# Patient Record
Sex: FEMALE | Race: WHITE | NOT HISPANIC OR LATINO | Employment: OTHER | URBAN - METROPOLITAN AREA
[De-identification: names, ages, dates, MRNs, and addresses within clinical notes are randomized per-mention and may not be internally consistent; named-entity substitution may affect disease eponyms.]

---

## 2017-12-12 ENCOUNTER — APPOINTMENT (OUTPATIENT)
Dept: WOMENS IMAGING | Facility: HOSPITAL | Age: 57
End: 2017-12-12

## 2017-12-12 PROCEDURE — G0202 SCR MAMMO BI INCL CAD: HCPCS | Performed by: RADIOLOGY

## 2017-12-12 PROCEDURE — MDREVIEWSP: Performed by: RADIOLOGY

## 2017-12-12 PROCEDURE — 77063 BREAST TOMOSYNTHESIS BI: CPT | Performed by: RADIOLOGY

## 2017-12-12 PROCEDURE — 77067 SCR MAMMO BI INCL CAD: CPT | Performed by: RADIOLOGY

## 2018-12-12 ENCOUNTER — APPOINTMENT (OUTPATIENT)
Dept: WOMENS IMAGING | Facility: HOSPITAL | Age: 58
End: 2018-12-12

## 2018-12-12 PROCEDURE — 77063 BREAST TOMOSYNTHESIS BI: CPT | Performed by: RADIOLOGY

## 2018-12-12 PROCEDURE — MDREVIEWSP: Performed by: RADIOLOGY

## 2018-12-12 PROCEDURE — 77080 DXA BONE DENSITY AXIAL: CPT | Performed by: RADIOLOGY

## 2018-12-12 PROCEDURE — 77067 SCR MAMMO BI INCL CAD: CPT | Performed by: RADIOLOGY

## 2019-04-04 ENCOUNTER — HOSPITAL ENCOUNTER (OUTPATIENT)
Dept: OTHER | Facility: HOSPITAL | Age: 59
Discharge: HOME | End: 2019-04-04

## 2019-04-04 LAB
25(OH)D3 SERPL-MCNC: 44.2 NG/ML (ref 30–100)
ALBUMIN SERPL-MCNC: 4.1 G/DL (ref 3.5–5)
ALBUMIN/GLOB SERPL: 1.2 {RATIO} (ref 1.4–2.6)
ALP SERPL-CCNC: 79 U/L (ref 53–141)
ALT SERPL-CCNC: 18 U/L (ref 10–40)
ANION GAP SERPL CALC-SCNC: 18 MMOL/L (ref 8–19)
AST SERPL-CCNC: 25 U/L (ref 15–50)
BASOPHILS # BLD AUTO: 0.04 10*3/UL (ref 0–0.2)
BASOPHILS NFR BLD AUTO: 0.8 % (ref 0–3)
BILIRUB SERPL-MCNC: 1.19 MG/DL (ref 0.2–1.3)
BUN SERPL-MCNC: 11 MG/DL (ref 5–25)
BUN/CREAT SERPL: 11 {RATIO} (ref 6–20)
CALCIUM SERPL-MCNC: 9.7 MG/DL (ref 8.7–10.4)
CHLORIDE SERPL-SCNC: 103 MMOL/L (ref 99–111)
CHOLEST SERPL-MCNC: 251 MG/DL (ref 107–200)
CHOLEST/HDLC SERPL: 4.6 {RATIO} (ref 3–6)
CONV ABS IMM GRAN: 0 10*3/UL (ref 0–0.2)
CONV CO2: 24 MMOL/L (ref 22–32)
CONV IMMATURE GRAN: 0 % (ref 0–1.8)
CONV TOTAL PROTEIN: 7.5 G/DL (ref 6.3–8.2)
CREAT UR-MCNC: 0.98 MG/DL (ref 0.5–0.9)
DEPRECATED RDW RBC AUTO: 41.5 FL (ref 36.4–46.3)
EOSINOPHIL # BLD AUTO: 0.13 10*3/UL (ref 0–0.7)
EOSINOPHIL # BLD AUTO: 2.5 % (ref 0–7)
ERYTHROCYTE [DISTWIDTH] IN BLOOD BY AUTOMATED COUNT: 12.9 % (ref 11.7–14.4)
EST. AVERAGE GLUCOSE BLD GHB EST-MCNC: 111 MG/DL
GFR SERPLBLD BASED ON 1.73 SQ M-ARVRAT: >60 ML/MIN/{1.73_M2}
GLOBULIN UR ELPH-MCNC: 3.4 G/DL (ref 2–3.5)
GLUCOSE SERPL-MCNC: 90 MG/DL (ref 65–99)
HBA1C MFR BLD: 13.7 G/DL (ref 12–16)
HBA1C MFR BLD: 5.5 % (ref 3.5–5.7)
HCT VFR BLD AUTO: 42.2 % (ref 37–47)
HDLC SERPL-MCNC: 54 MG/DL (ref 40–60)
LDLC SERPL CALC-MCNC: 177 MG/DL (ref 70–100)
LYMPHOCYTES # BLD AUTO: 1.58 10*3/UL (ref 1–5)
MCH RBC QN AUTO: 28.5 PG (ref 27–31)
MCHC RBC AUTO-ENTMCNC: 32.5 G/DL (ref 33–37)
MCV RBC AUTO: 87.7 FL (ref 81–99)
MONOCYTES # BLD AUTO: 0.37 10*3/UL (ref 0.2–1.2)
MONOCYTES NFR BLD AUTO: 7.2 % (ref 3–10)
NEUTROPHILS # BLD AUTO: 3.02 10*3/UL (ref 2–8)
NEUTROPHILS NFR BLD AUTO: 58.8 % (ref 30–85)
NRBC CBCN: 0 % (ref 0–0.7)
OSMOLALITY SERPL CALC.SUM OF ELEC: 291 MOSM/KG (ref 273–304)
PLATELET # BLD AUTO: 271 10*3/UL (ref 130–400)
PMV BLD AUTO: 9.8 FL (ref 9.4–12.3)
POTASSIUM SERPL-SCNC: 4.1 MMOL/L (ref 3.5–5.3)
RBC # BLD AUTO: 4.81 10*6/UL (ref 4.2–5.4)
SODIUM SERPL-SCNC: 141 MMOL/L (ref 135–147)
TRIGL SERPL-MCNC: 99 MG/DL (ref 40–150)
TSH SERPL-ACNC: 0.76 M[IU]/L (ref 0.27–4.2)
VARIANT LYMPHS NFR BLD MANUAL: 30.7 % (ref 20–45)
VLDLC SERPL-MCNC: 20 MG/DL (ref 5–37)
WBC # BLD AUTO: 5.14 10*3/UL (ref 4.8–10.8)

## 2020-01-13 ENCOUNTER — APPOINTMENT (OUTPATIENT)
Dept: WOMENS IMAGING | Facility: HOSPITAL | Age: 60
End: 2020-01-13

## 2020-01-13 PROCEDURE — MDREVIEWSP: Performed by: RADIOLOGY

## 2020-01-13 PROCEDURE — 77063 BREAST TOMOSYNTHESIS BI: CPT | Performed by: RADIOLOGY

## 2020-01-13 PROCEDURE — 77067 SCR MAMMO BI INCL CAD: CPT | Performed by: RADIOLOGY

## 2020-07-17 ENCOUNTER — CONVERSION ENCOUNTER (OUTPATIENT)
Dept: FAMILY MEDICINE CLINIC | Facility: CLINIC | Age: 60
End: 2020-07-17

## 2020-07-17 ENCOUNTER — OFFICE VISIT CONVERTED (OUTPATIENT)
Dept: FAMILY MEDICINE CLINIC | Facility: CLINIC | Age: 60
End: 2020-07-17
Attending: NURSE PRACTITIONER

## 2020-07-29 ENCOUNTER — HOSPITAL ENCOUNTER (OUTPATIENT)
Dept: LAB | Facility: HOSPITAL | Age: 60
Discharge: HOME | End: 2020-07-29
Attending: NURSE PRACTITIONER

## 2020-07-29 LAB
25(OH)D3 SERPL-MCNC: 77.2 NG/ML (ref 30–100)
ALBUMIN SERPL-MCNC: 4 G/DL (ref 3.5–5)
ALBUMIN/GLOB SERPL: 1.2 {RATIO} (ref 1.4–2.6)
ALP SERPL-CCNC: 69 U/L (ref 53–141)
ALT SERPL-CCNC: 12 U/L (ref 10–40)
ANION GAP SERPL CALC-SCNC: 14 MMOL/L (ref 8–19)
AST SERPL-CCNC: 20 U/L (ref 15–50)
BASOPHILS # BLD AUTO: 0.02 10*3/UL (ref 0–0.2)
BASOPHILS NFR BLD AUTO: 0.3 % (ref 0–3)
BILIRUB SERPL-MCNC: 0.99 MG/DL (ref 0.2–1.3)
BUN SERPL-MCNC: 10 MG/DL (ref 5–25)
BUN/CREAT SERPL: 10 {RATIO} (ref 6–20)
CALCIUM SERPL-MCNC: 9.9 MG/DL (ref 8.7–10.4)
CHLORIDE SERPL-SCNC: 103 MMOL/L (ref 99–111)
CHOLEST SERPL-MCNC: 230 MG/DL (ref 107–200)
CHOLEST/HDLC SERPL: 4.1 {RATIO} (ref 3–6)
CONV ABS IMM GRAN: 0.02 10*3/UL (ref 0–0.2)
CONV CO2: 28 MMOL/L (ref 22–32)
CONV IMMATURE GRAN: 0.3 % (ref 0–1.8)
CONV TOTAL PROTEIN: 7.3 G/DL (ref 6.3–8.2)
CREAT UR-MCNC: 0.98 MG/DL (ref 0.5–0.9)
DEPRECATED RDW RBC AUTO: 45.4 FL (ref 36.4–46.3)
EOSINOPHIL # BLD AUTO: 0.14 10*3/UL (ref 0–0.7)
EOSINOPHIL # BLD AUTO: 2.4 % (ref 0–7)
ERYTHROCYTE [DISTWIDTH] IN BLOOD BY AUTOMATED COUNT: 13.5 % (ref 11.7–14.4)
EST. AVERAGE GLUCOSE BLD GHB EST-MCNC: 117 MG/DL
FOLATE SERPL-MCNC: 14.3 NG/ML (ref 4.8–20)
GFR SERPLBLD BASED ON 1.73 SQ M-ARVRAT: >60 ML/MIN/{1.73_M2}
GLOBULIN UR ELPH-MCNC: 3.3 G/DL (ref 2–3.5)
GLUCOSE SERPL-MCNC: 93 MG/DL (ref 65–99)
HBA1C MFR BLD: 5.7 % (ref 3.5–5.7)
HCT VFR BLD AUTO: 45.2 % (ref 37–47)
HDLC SERPL-MCNC: 56 MG/DL (ref 40–60)
HGB BLD-MCNC: 14.1 G/DL (ref 12–16)
LDLC SERPL CALC-MCNC: 158 MG/DL (ref 70–100)
LYMPHOCYTES # BLD AUTO: 1.49 10*3/UL (ref 1–5)
LYMPHOCYTES NFR BLD AUTO: 25.7 % (ref 20–45)
MCH RBC QN AUTO: 28.4 PG (ref 27–31)
MCHC RBC AUTO-ENTMCNC: 31.2 G/DL (ref 33–37)
MCV RBC AUTO: 91.1 FL (ref 81–99)
MONOCYTES # BLD AUTO: 0.32 10*3/UL (ref 0.2–1.2)
MONOCYTES NFR BLD AUTO: 5.5 % (ref 3–10)
NEUTROPHILS # BLD AUTO: 3.8 10*3/UL (ref 2–8)
NEUTROPHILS NFR BLD AUTO: 65.8 % (ref 30–85)
NRBC CBCN: 0 % (ref 0–0.7)
OSMOLALITY SERPL CALC.SUM OF ELEC: 291 MOSM/KG (ref 273–304)
PLATELET # BLD AUTO: 240 10*3/UL (ref 130–400)
PMV BLD AUTO: 9.3 FL (ref 9.4–12.3)
POTASSIUM SERPL-SCNC: 4.2 MMOL/L (ref 3.5–5.3)
RBC # BLD AUTO: 4.96 10*6/UL (ref 4.2–5.4)
SODIUM SERPL-SCNC: 141 MMOL/L (ref 135–147)
T4 FREE SERPL-MCNC: 1.3 NG/DL (ref 0.9–1.8)
TRIGL SERPL-MCNC: 78 MG/DL (ref 40–150)
TSH SERPL-ACNC: 0.96 M[IU]/L (ref 0.27–4.2)
VIT B12 SERPL-MCNC: 1349 PG/ML (ref 211–911)
VLDLC SERPL-MCNC: 16 MG/DL (ref 5–37)
WBC # BLD AUTO: 5.79 10*3/UL (ref 4.8–10.8)

## 2020-08-05 ENCOUNTER — HOSPITAL ENCOUNTER (OUTPATIENT)
Dept: GENERAL RADIOLOGY | Facility: HOSPITAL | Age: 60
Discharge: HOME | End: 2020-08-05
Attending: NURSE PRACTITIONER

## 2020-09-25 ENCOUNTER — HOSPITAL ENCOUNTER (OUTPATIENT)
Dept: CARDIOLOGY | Facility: HOSPITAL | Age: 60
Discharge: HOME | End: 2020-09-25
Attending: NURSE PRACTITIONER

## 2021-01-15 ENCOUNTER — APPOINTMENT (OUTPATIENT)
Dept: WOMENS IMAGING | Facility: HOSPITAL | Age: 61
End: 2021-01-15

## 2021-01-15 PROCEDURE — 77067 SCR MAMMO BI INCL CAD: CPT | Performed by: RADIOLOGY

## 2021-01-15 PROCEDURE — 77063 BREAST TOMOSYNTHESIS BI: CPT | Performed by: RADIOLOGY

## 2021-03-17 ENCOUNTER — OFFICE VISIT CONVERTED (OUTPATIENT)
Dept: FAMILY MEDICINE CLINIC | Facility: CLINIC | Age: 61
End: 2021-03-17
Attending: NURSE PRACTITIONER

## 2021-03-17 ENCOUNTER — HOSPITAL ENCOUNTER (OUTPATIENT)
Dept: LAB | Facility: HOSPITAL | Age: 61
Discharge: HOME | End: 2021-03-17
Attending: NURSE PRACTITIONER

## 2021-03-17 LAB — IMMUNOCAP RESULT: NORMAL (ref 0–0)

## 2021-03-19 LAB
IGE SERPL-ACNC: 624 K[IU]/ML (ref 0–24)
LTX IGE QN: <0.1 K[IU]/ML (ref 0–0.35)

## 2021-03-20 LAB
ALP SERPL-CCNC: 70 U/L (ref 43–160)
ASO AB SERPL-ACNC: 117 [IU]/ML (ref 0–200)
CALCIUM SERPL-MCNC: 9.5 MG/DL (ref 8.7–10.4)
CONV RHEUMATOID FACTOR IGM: <10 [IU]/ML (ref 0–14)
CRP SERPL-MCNC: 5.8 MG/L (ref 0–5)
DSDNA AB SER-ACNC: NEGATIVE [IU]/ML
ENA AB SER IA-ACNC: NEGATIVE {RATIO}
ERYTHROCYTE [SEDIMENTATION RATE] IN BLOOD: 7 MM/H (ref 0–30)
PHOSPHATE SERPL-MCNC: 3.5 MG/DL (ref 2.4–4.5)
URATE SERPL-MCNC: 4.9 MG/DL (ref 2.5–7.5)

## 2021-05-07 ENCOUNTER — OFFICE VISIT CONVERTED (OUTPATIENT)
Dept: CARDIOLOGY | Facility: CLINIC | Age: 61
End: 2021-05-07
Attending: INTERNAL MEDICINE

## 2021-05-10 NOTE — H&P
History and Physical      Patient Name: Celeste Solomon   Patient ID: [id number]   Sex: Female   YOB: 1960    Primary Care Provider: Stephen SANDOVAL    Visit Date: July 17, 2020    Provider: LORI Barrientos   Location: [facility]   Location Address: [address] [place] [address], [address]  [id number]   Location Phone: [phone]          Chief Complaint     The patient is here to establish care, needs refills of two medications. She is having heart palpitations.       History Of Present Illness  Celeste Solomon is a 60 year old /White female who presents for evaluation and treatment of:      Here to establish care.    Hypothyroidism:  takes Branded thyroid.    Vitamin D doing well needs labs.    Headaches.       Past Medical History  Disease Name Date Onset Notes   Allergies --  --    Migraines --  --    Patellofemoral syndrome, right knee 07/31/2017 --    Right Shoulder Impingement 07/28/2017 --    Thyroid disorder --  --          Past Surgical History  Procedure Name Date Notes   Breast biopsy 2018 --    Cesarian Section --  --    Colonoscopy --  --    Hysterectomy 2010 prolase   Urethral sling repair with combined anteroposterior colporrhaphy and sacrospinous fixation 2016 --          Medication List  Name Date Started Instructions   Metrogel 1 % topical gel  apply to the affected area(s) by topical route once daily ; rub in gently and completely   Synthroid 25 mcg oral tablet  take 1 tablet (25 mcg) by oral route once daily   vitamin B12-folic acid 500-400 mcg oral tablet  take 1 tablet by oral route daily   Vitamin D3 50 mcg (2,000 unit) oral capsule  take 1 capsule by oral route daily   zolmitriptan 5 mg oral tablet 05/17/2017 --          Allergy List  Allergen Name Date Reaction Notes   SULFA (SULFONAMIDES) --  --  --          Family Medical History  Disease Name Relative/Age Notes   Diabetes, unspecified type Father/   Father   Family history of lung  "cancer  --    Family history of throat cancer  --          Social History  Finding Status Start/Stop Quantity Notes   Alcohol Use Never --/-- --  does not drink   Claustophobic Unknown --/-- --  yes   Homemaker. --  --/-- --  --    lives with spouse --  --/-- --  --    . --  --/-- --  --    Recreational Drug Use Never --/-- --  no   Tobacco Never --/-- --  never smoker         Review of Systems  · Constitutional  o Denies  o : fever, fatigue, weight loss, weight gain  · Cardiovascular  o Admits  o : palpitations  o Denies  o : lower extremity edema, claudication, chest pressure  · Respiratory  o Denies  o : shortness of breath, wheezing, cough, hemoptysis, dyspnea on exertion  · Gastrointestinal  o Denies  o : nausea, vomiting, diarrhea, constipation, abdominal pain      Vitals  Date Time BP Position Site L\R Cuff Size HR RR TEMP (F) WT  HT  BMI kg/m2 BSA m2 O2 Sat        07/17/2020 03:18 /82 Sitting    68 - R 16 96.9 134lbs 0oz 5'  2\" 24.51 1.63 97 %          Physical Examination  · Constitutional  o Appearance  o : well-nourished, well developed, alert, in no acute distress  · Eyes  o Conjunctivae  o : conjunctivae normal  o Sclerae  o : sclerae white  o Pupils and Irises  o : pupils equal, round, and reactive to light and accommodation bilaterally  o Corneas  o : tear film normal, no lesions present  o Eyelids/Ocular Adnexae  o : eyelid appearance normal, no exudates present, eye moisture level normal  · Ears, Nose, Mouth and Throat  o Ears  o : external ear auricle normal, otic canal normal, TM with no reddness, effusion, retraction  o Nose  o : external normal, nasal mucosa normal, turbinates normal  o Oral Cavity  o : tongue no lesion, oral mucosa normal  o Throat  o : no erythemia, exudate or lesions  · Neck  o Inspection/Palpation  o : normal appearance, no masses or tenderness, trachea midline, no enlarged cervical or supraclavicular lymphnodes palpated  o Thyroid  o : gland size normal, " nontender, no nodules or masses present on palpation, thyroid motion normal during swallowing  · Respiratory  o Respiratory Effort  o : breathing unlabored  o Inspection of Chest  o : normal appearance, no retractions  o Auscultation of Lungs  o : normal breath sounds throughout  · Cardiovascular  o Heart  o :   § Auscultation of Heart  § : regular rate and rhythm without murmur, PMI normal  o Peripheral Vascular System  o :   § Carotid Arteries  § : normal pulses bilaterally, no bruits present  § Pedal Pulses  § : pulses 2 bilaterally  § Extremities  § : no cyanosis, clubbing or edema; less than 2 second refill noted  · Musculoskeletal  o General  o : No joint swelling or deformity noted. Muscle tone, strength and development grossly normal.  · Skin and Subcutaneous Tissue  o General Inspection  o : no rashes or lesions present, no areas of discoloration  · Neurologic  o Mental Status Examination  o : judgement, insight intact, modd and affect appropriate  o Motor Examination  o : strength grossly intact in all four extremities  o Gait and Station  o : normal gait, able to stand without difficulty          Assessment  · Screening for depression     V79.0/Z13.89  · Screening for colon cancer     V76.51/Z12.11  · Screening for cervical cancer     V76.2/Z12.4  · Hyperlipidemia     272.4/E78.5  · Hypothyroidism     244.9/E03.9  · Other long term (current) drug therapy     V58.69/Z79.899  · Vitamin D deficiency     268.9/E55.9  · Palpitations     785.1/R00.2      Plan  · Orders  o Annual depression screening, 15 minutes (80242, ) - V79.0/Z13.89 - 07/17/2020  o ACO-18: Negative screen for clinical depression using a standardized tool () - V79.0/Z13.89 - 07/17/2020   paperwork was dropped off on 6/29/20 and scanned in  o Cologuard (53497) - V76.51/Z12.11 - 07/17/2020  o Lipid Panel [facility] (48550) - 272.4/E78.5 - 07/17/2020  o Thyroid Profile (99056, 84085, THYII) - 244.9/E03.9 - 07/17/2020  o Thyroid Ultrasound.  (10792) - 244.9/E03.9 - 07/17/2020  o Vitamin D Level (27097) - 268.9/E55.9 - 07/17/2020  o CBC with Auto Diff [facility] (64703) - V58.69/Z79.899 - 07/17/2020  o CMP H (01894) - V58.69/Z79.899 - 07/17/2020  o ACO-39: Current medications updated and reviewed () - - 07/17/2020  o ACO-14: Influenza immunization administered or previously received () - - 07/17/2020  o OB/GYN CONSULTATION (OBGYN) - - 07/17/2020  o B12 Folate levels (B12FO) - V58.69/Z79.899 - 07/17/2020  o Event Monitor Recording (55996) - 785.1/R00.2 - 07/17/2020   7 day  · Medications  o zolmitriptan 5 mg oral tablet   SIG: take 1 tablet (5 mg) by oral route once; if headache returns, the dose may be repeated after 2 hours,   DISP: (27) tablets with 1 refills  Prescribed on 07/17/2020     o Synthroid 125 mcg oral tablet   SIG: take 0.5 tablet by oral route daily for 90 days   DISP: (45) tablets with 3 refills  Adjusted on 07/17/2020     o Medications have been Reconciled  o Transition of Care or Provider Policy  · Instructions  o Depression Screen completed and scanned into the EMR under the designated folder within the patient's documents.  o Today's PHQ-9 result is 0___  o The provider screening met the required time of 15 minutes.  o Advised that cheeses and other sources of dairy fats, animal fats, fast food, and the extras (candy, pastries, pies, doughnuts and cookies) all contain LDL raising nutrients. Advised to increase fruits, vegetables, whole grains, and to monitor portion sizes.   o Patient was educated/instructed on their diagnosis, treatment and medications prior to discharge from the clinic today.  o Minutes spent with patient including greater than 50% in Education/Counseling/Care Coordination.  o Time spent with the patient was minutes, more than 50% face to face.  · Disposition  o F/U in 1 year            Electronically Signed by: LORI Barrientos -Author on July 17, 2020 04:01:30 PM

## 2021-05-14 VITALS
HEART RATE: 60 BPM | OXYGEN SATURATION: 96 % | HEIGHT: 62 IN | BODY MASS INDEX: 25.58 KG/M2 | WEIGHT: 139 LBS | SYSTOLIC BLOOD PRESSURE: 116 MMHG | RESPIRATION RATE: 16 BRPM | DIASTOLIC BLOOD PRESSURE: 80 MMHG

## 2021-05-14 NOTE — PROGRESS NOTES
Progress Note      Patient Name: Celeste Solomon   Patient ID: [id number]   Sex: Female   YOB: 1960    Primary Care Provider: Stephen SANDOVAL    Visit Date: March 17, 2021    Provider: LORI Barrientos   [place] [facility] [place]   Location Address: [address] [place] [address], [address] [place] [address]  [id number]   Location Phone: [phone]          Chief Complaint     The patient is here for muscle pain in her hands, feet and right sciatica.       History Of Present Illness  Celeste Solomon is a 61 year old /White female who presents for evaluation and treatment of:      muscle around hip and radiating to thigh.  Trouble with nondominant right hand.  Difficulty with gripping.  Eye exam states may have connective tissue disorder.    Sciatica in the past and doesn't feel like that and bone density was normal.    Palpatations. will get shortness of breath but denies chest pain.  Had history in the past of electrical issues and chest pain.    AHd a rash and irritation after wearing heart monitor.       Past Medical History  Disease Name Date Onset Notes   Allergies --  --    Migraines --  --    Patellofemoral syndrome, right knee 07/31/2017 --    Right Shoulder Impingement 07/28/2017 --    Thyroid disorder --  --          Past Surgical History  Procedure Name Date Notes   Breast biopsy 2018 --    Cesarian Section --  --    Colonoscopy --  --    Hysterectomy 2010 prolase   Urethral sling repair with combined anteroposterior colporrhaphy and sacrospinous fixation 2016 --          Medication List  Name Date Started Instructions   Metrogel 1 % topical gel  apply to the affected area(s) by topical route once daily ; rub in gently and completely   Synthroid 125 mcg oral tablet 07/17/2020 take 0.5 tablet by oral route daily for 90 days   Transderm-Scop 1 mg over 3 days transdermal patch 3 day 08/07/2020 apply 1 patch by transdermal route to the hairless area behind 1 ear ;  reapply every 3 days as needed   vitamin B12-folic acid 500-400 mcg oral tablet  take 1 tablet by oral route daily   Vitamin D3 50 mcg (2,000 unit) oral capsule  take 1 capsule by oral route daily   zolmitriptan 5 mg oral tablet 07/17/2020 take 1 tablet (5 mg) by oral route once; if headache returns, the dose may be repeated after 2 hours,         Allergy List  Allergen Name Date Reaction Notes   SULFA (SULFONAMIDES) --  --  --        Allergies Reconciled  Family Medical History  Disease Name Relative/Age Notes   Diabetes, unspecified type Father/   Father   Family history of lung cancer  --    Family history of throat cancer  --          Social History  Finding Status Start/Stop Quantity Notes   Alcohol Use Never --/-- --  does not drink   Claustophobic Unknown --/-- --  yes   Homemaker. --  --/-- --  --    lives with spouse --  --/-- --  --    . --  --/-- --  --    Recreational Drug Use Never --/-- --  no   Tobacco Never --/-- --  never smoker         Immunizations  NameDate Admin Mfg Trade Name Lot Number Route Inj VIS Given VIS Publication   COVID Pwhtzu72/10/2021 NE Not Entered  NE NE 03/17/2021    Comments:    COVID Nwukjx2102/17/2021 NE Not Entered  NE NE 03/17/2021    Comments:    Hepatitis A05/01/2019 [facility] HAVRIX-ADULT  NE NE 07/17/2020    Comments:    Hepatitis A10/01/2018 [facility] HAVRIX-ADULT  NE NE 07/17/2020    Comments:    Msisdhwgm69/01/2020 [facility] Fluarix, quadrivalent, preservative free 2A2KX NE NE 03/17/2021    Comments:    Vbpisooz45/02/2020 MSD ZOSTAVAX  NE NE 07/17/2020    Comments:    Pxwznczv06/01/2019 MSD ZOSTAVAX  NE NE 07/17/2020    Comments:    Td10/01/2012 [facility] DECAVAC  NE NE 07/17/2020    Comments:          Review of Systems  · Constitutional  o Denies  o : fever, fatigue, weight loss, weight gain  · Cardiovascular  o Denies  o : lower extremity edema, claudication, chest pressure, palpitations  · Respiratory  o Admits  o : shortness of breath  o Denies  o : wheezing, cough, hemoptysis,  "dyspnea on exertion  · Gastrointestinal  o Denies  o : nausea, vomiting, diarrhea, constipation, abdominal pain  · Musculoskeletal  o Admits  o : muscle pain, foot pain, leg pain and hand pain      Vitals  Date Time BP Position Site L\R Cuff Size HR RR TEMP (F) WT  HT  BMI kg/m2 BSA m2 O2 Sat FR L/min FiO2        03/17/2021 11:13 /80 Sitting    60 - R 16  139lbs 0oz 5'  2\" 25.42 1.66 96 %  21%          Physical Examination  · Constitutional  o Appearance  o : well-nourished, well developed, alert, in no acute distress  · Eyes  o Conjunctivae  o : conjunctivae normal  o Sclerae  o : sclerae white  o Pupils and Irises  o : pupils equal, round, and reactive to light and accommodation bilaterally  o Corneas  o : tear film normal, no lesions present  o Eyelids/Ocular Adnexae  o : eyelid appearance normal, no exudates present, eye moisture level normal  · Ears, Nose, Mouth and Throat  o Ears  o : external ear auricle normal, otic canal normal, TM with no reddness, effusion, retraction  o Nose  o : external normal, nasal mucosa normal, turbinates normal  o Oral Cavity  o : tongue no lesion, oral mucosa normal  o Throat  o : no erythemia, exudate or lesions  · Neck  o Inspection/Palpation  o : normal appearance, no masses or tenderness, trachea midline, no enlarged cervical or supraclavicular lymphnodes palpated  o Thyroid  o : gland size normal, nontender, no nodules or masses present on palpation, thyroid motion normal during swallowing  · Respiratory  o Respiratory Effort  o : breathing unlabored  o Inspection of Chest  o : normal appearance, no retractions  o Auscultation of Lungs  o : normal breath sounds throughout  · Cardiovascular  o Heart  o :   § Auscultation of Heart  § : regular rate and rhythm without murmur, PMI normal  o Peripheral Vascular System  o :   § Carotid Arteries  § : normal pulses bilaterally, no bruits present  § Pedal Pulses  § : pulses 2 bilaterally  § Extremities  § : no cyanosis, " clubbing or edema; less than 2 second refill noted  · Musculoskeletal  o General  o : No joint swelling or deformity noted. Muscle tone, strength and development grossly normal.  · Skin and Subcutaneous Tissue  o General Inspection  o : no rashes or lesions present, no areas of discoloration  · Neurologic  o Mental Status Examination  o : judgement, insight intact, modd and affect appropriate  o Motor Examination  o : strength grossly intact in all four extremities  o Gait and Station  o : normal gait, able to stand without difficulty          Assessment  · Dermatitis     692.9/L30.9  · Polyarthralgia     719.49/M25.50  · Palpitation     785.1/R00.2  · Shortness of breath     786.05/R06.02      Plan  · Orders  o RA Profile 2 (Ca, Phos, Alk Phos, CRP, ESR, Uric Acid, ASO, RF IgM, LAKESHA) [facility] (40980, 52766, 69410, 87212, 18234, 94406, 66660, 74102, 03696) - 719.49/M25.50 - 03/17/2021  o ACO-14: Influenza immunization administered or previously received [facility] () - - 03/17/2021  o ACO-39: Current medications updated and reviewed (1159F, ) - - 03/17/2021  o CARDIOLOGY CONSULTATION (CARDI) - 785.1/R00.2, 786.05/R06.02 - 03/17/2021   Pankaj Lopez Dawson. first available  o Latex IgE (64523) - 692.9/L30.9 - 03/17/2021  · Medications  o Medications have been Reconciled  o Transition of Care or Provider Policy  · Instructions  o Patient was educated/instructed on their diagnosis, treatment and medications prior to discharge from the clinic today.  o Minutes spent with patient including greater than 50% in Education/Counseling/Care Coordination.  o Time spent with the patient was minutes, more than 50% face to face.  · Disposition  o Return in 6 months  o Care Transition  o SORIN Sent            Electronically Signed by: LORI Barrientos -Author on March 18, 2021 11:58:03 AM

## 2021-05-15 VITALS
TEMPERATURE: 96.9 F | HEART RATE: 68 BPM | WEIGHT: 134 LBS | BODY MASS INDEX: 24.66 KG/M2 | HEIGHT: 62 IN | DIASTOLIC BLOOD PRESSURE: 82 MMHG | OXYGEN SATURATION: 97 % | SYSTOLIC BLOOD PRESSURE: 138 MMHG | RESPIRATION RATE: 16 BRPM

## 2021-06-05 NOTE — H&P
History and Physical      Patient Name: Celeste Solomon   Patient ID: [id number]   Sex: Female   YOB: 1960    Primary Care Provider: Stephen SANDOVAL   Referring Provider: Stephen SANDOVAL    Visit Date: May 7, 2021    Provider: Enoch Curtis MD   Location: [facility]   Location Address: [address] [place] [address], [address]   [place]  [id number]   Location Phone: [phone]          History Of Present Illness  Consult requested by: Stephen SANDOVAL   Celeste Solomon is a 61 year old /White female who has no previous cardiac history. Around 2020, she developed a rash that was difficult to determine the etiology. She initially had some basic treatment of that, but it recurred in July, and then, she saw a dermatologist and she was treated with steroids for a time. 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 She feels intermittent flutters. Sometimes, the heart was fast, sometimes skipping, lasting 1-2 minutes, occurring 3-4 times per week, but much less frequently now. It is random. It is not specifically induced by exertion. She had a 7-day event monitor in October, which showed mild PVCs, much less than 1% frequency. This did correlate with some of her symptomatic episodes, but no other significant arrhythmias were observed.   PAST MEDICAL & SURGICAL HISTORY: Allergies; Migraines; Patellofemoral syndrome, right knee; Right Shoulder Impingement; Thyroid disorder; Breast biopsy;  Section; Colonoscopy; Hysterectomy; Urethral sling repair with combined anteroposterior colporrhaphy and sacrospinous fixation.   PSYCHOSOCIAL HISTORY: Previous tobacco use, but quit. Rarely consumes alcohol. Moderate caffeine intake. She is .   FAMILY HISTORY: Positive for diabetes mellitus. Negative for hypertension or heart disease.   CURRENT MEDICATIONS: Synthroid  "0.125 mg 1/2 daily; Zomig 2.5 mg p.r.n.; vitamin C daily; vitamin B12 daily.   ALLERGIES: SULFA (SULFONAMIDES).       Review of Systems  · Constitutional  o Admits  o : good general health lately  o Denies  o : fatigue, recent weight changes   · Eyes  o Denies  o : blurred vision  · HENT  o Denies  o : hearing loss or ringing, chronic sinus problem, swollen glands in neck  · Cardiovascular  o Admits  o : palpitations (fast, fluttering, or skipping beats), shortness of breath with activities  o Denies  o : chest pain, swelling (feet, ankles, hands)  · Respiratory  o Denies  o : chronic or frequent cough, asthma or wheezing, COPD  · Gastrointestinal  o Denies  o : ulcers, nausea or vomiting  · Neurologic  o Denies  o : lightheaded or dizzy, stroke, headaches  · Musculoskeletal  o Admits  o : joint pain  o Denies  o : back pain  · Endocrine  o Admits  o : thyroid disease, heat or cold intolerance  o Denies  o : diabetes, excessive thirst or urination  · Heme-Lymph  o Denies  o : bleeding or bruising tendency, anemia      Vitals  Date Time BP Position Site L\R Cuff Size HR RR TEMP (F) WT  HT  BMI kg/m2 BSA m2 O2 Sat FR L/min FiO2 HC       05/07/2021 11:14 /72 Sitting    56 - R   139lbs 0oz 5'  2\" 25.42 1.66             Physical Examination  · Constitutional  o Appearance  o : Awake, alert, in no acute distress.  · Head and Face  o HEENT  o : No pallor, anicteric. Eyes normal. Moist mucous membranes.  · Neck  o Inspection/Palpation  o : Supple.   o Jugular Veins  o : No JVD. No carotid bruits.  · Respiratory  o Auscultation of Lungs  o : Clear to auscultation bilaterally. No crackles or wheezing.  · Cardiovascular  o Heart  o : S1, S2 is normally heard. No S3. No murmur, rubs, or gallops.  · Gastrointestinal  o Abdominal Examination  o : Soft, non-distended. No palpable hepatosplenomegaly. Bowel sounds heard in all four quadrants.  · Musculoskeletal  o General  o : Normal muscle tone and strength.  · Skin and " Subcutaneous Tissue  o General Inspection  o : No skin rashes.  · Extremities  o Extremities  o : Warm and well perfused. Distal pulses present. No pitting pedal edema.  · EKG  o EKG  o : Performed in the office today, reviewed by me.  o Indications  o : Palpitations.  o Results  o : Sinus rhythm. Borderline T-wave abnormalities. Otherwise normal EKG.   · Labs  o Labs  o : Laboratory studies reviewed. CBC and Chemistry normal. Thyroid profile normal.   · Diagnostic Testing  o Diagnostic Testing  o : I reviewed her event monitor from October.           Assessment     1.  Palpitations.  Currently, they are improved compared to last fall.  At that time, she had an event monitor,        which showed mildly frequent PVCs.  She otherwise has no specific cardiac symptoms.  Her baseline EKG is        unremarkable.  Basic labs are normal.  2.  Hypothyroidism, stable.       Plan     Based on my discussion with Ms. Bui today and her event monitor results from October, I think she is likely having occasional symptoms from ectopy.  Her symptoms have actually improved more recently.  There are no clinical features suggesting any high-risk arrhythmia.  I discussed with her I think a watchful-waiting approach is reasonable at this point.  I have not ordered any other cardiac diagnostics at this time.  The patient is agreeable with this plan.  She will be followed as needed in the office.             Electronically Signed by: Corrine Solano-, Other -Author on May 10, 2021 08:03:45 AM  Electronically Co-signed by: Enoch Curtis MD -Reviewer on May 10, 2021 09:21:12 AM

## 2021-06-21 ENCOUNTER — TELEPHONE (OUTPATIENT)
Dept: FAMILY MEDICINE CLINIC | Facility: CLINIC | Age: 61
End: 2021-06-21

## 2021-06-21 DIAGNOSIS — M79.641 BILATERAL HAND PAIN: Primary | ICD-10-CM

## 2021-06-21 DIAGNOSIS — M79.642 BILATERAL HAND PAIN: Primary | ICD-10-CM

## 2021-06-21 NOTE — TELEPHONE ENCOUNTER
Caller: Celeste Bui    Relationship: Self    Best call back number:[phone]    What is the best time to reach you:ANYTIME    Who are you requesting to speak with (clinical staff, provider,  specific staff member):NOLA SLOAN    Do you know the name of the person who called:     What was the call regarding: PATIENT CALLED IN STATED SHE IS HAVING PAIN IN BOTH HANDS AND WOULD LIKE A REFERRAL PLEASE ADVISE    Do you require a callback: YES

## 2021-06-21 NOTE — TELEPHONE ENCOUNTER
Pt feels like it is soft tissue pain that moves around, sometimes in the palm, doesn't feel like joint pain. Pt stated she has discussed it with you before, she also stated she has Dupuytren’s, but doesn't feel it is a symptom of that.  She has been to [facility] before.

## 2021-06-28 ENCOUNTER — LAB (OUTPATIENT)
Dept: LAB | Facility: HOSPITAL | Age: 61
End: 2021-06-28

## 2021-06-28 ENCOUNTER — TRANSCRIBE ORDERS (OUTPATIENT)
Dept: FAMILY MEDICINE CLINIC | Facility: CLINIC | Age: 61
End: 2021-06-28

## 2021-06-28 DIAGNOSIS — R89.9 ABNORMAL LABORATORY TEST: Primary | ICD-10-CM

## 2021-06-28 DIAGNOSIS — R89.9 ABNORMAL LABORATORY TEST: ICD-10-CM

## 2021-06-28 LAB — CRP SERPL-MCNC: 0.45 MG/DL (ref 0.01–0.5)

## 2021-06-28 PROCEDURE — 82785 ASSAY OF IGE: CPT

## 2021-06-28 PROCEDURE — 36415 COLL VENOUS BLD VENIPUNCTURE: CPT

## 2021-06-28 PROCEDURE — 86141 C-REACTIVE PROTEIN HS: CPT

## 2021-06-30 ENCOUNTER — TELEPHONE (OUTPATIENT)
Dept: FAMILY MEDICINE CLINIC | Facility: CLINIC | Age: 61
End: 2021-06-30

## 2021-06-30 DIAGNOSIS — L50.9 HIVES: Primary | ICD-10-CM

## 2021-06-30 LAB — IGE SERPL-ACNC: 543 KU/L

## 2021-07-02 NOTE — TELEPHONE ENCOUNTER
Pt would like to go the allergy route, she would like to get labs for allergies because they won't do the prick test due to her hives getting big when they tried. Pended orders for her to get her labs for her visit and allergy testing.

## 2021-07-06 ENCOUNTER — LAB (OUTPATIENT)
Dept: LAB | Facility: HOSPITAL | Age: 61
End: 2021-07-06

## 2021-07-06 DIAGNOSIS — L50.9 HIVES: ICD-10-CM

## 2021-07-06 LAB
ALBUMIN SERPL-MCNC: 4.1 G/DL (ref 3.5–5.2)
ALBUMIN/GLOB SERPL: 1.4 G/DL
ALP SERPL-CCNC: 69 U/L (ref 39–117)
ALT SERPL W P-5'-P-CCNC: 20 U/L (ref 1–33)
ANION GAP SERPL CALCULATED.3IONS-SCNC: 6.5 MMOL/L (ref 5–15)
AST SERPL-CCNC: 21 U/L (ref 1–32)
BASOPHILS # BLD AUTO: 0.03 10*3/MM3 (ref 0–0.2)
BASOPHILS NFR BLD AUTO: 0.5 % (ref 0–1.5)
BILIRUB SERPL-MCNC: 0.5 MG/DL (ref 0–1.2)
BUN SERPL-MCNC: 11 MG/DL (ref 8–23)
BUN/CREAT SERPL: 13.9 (ref 7–25)
CALCIUM SPEC-SCNC: 9.4 MG/DL (ref 8.6–10.5)
CHLORIDE SERPL-SCNC: 106 MMOL/L (ref 98–107)
CHOLEST SERPL-MCNC: 272 MG/DL (ref 0–200)
CO2 SERPL-SCNC: 28.5 MMOL/L (ref 22–29)
CREAT SERPL-MCNC: 0.79 MG/DL (ref 0.57–1)
DEPRECATED RDW RBC AUTO: 41 FL (ref 37–54)
EOSINOPHIL # BLD AUTO: 0.15 10*3/MM3 (ref 0–0.4)
EOSINOPHIL NFR BLD AUTO: 2.7 % (ref 0.3–6.2)
ERYTHROCYTE [DISTWIDTH] IN BLOOD BY AUTOMATED COUNT: 13.2 % (ref 12.3–15.4)
GFR SERPL CREATININE-BSD FRML MDRD: 74 ML/MIN/1.73
GLOBULIN UR ELPH-MCNC: 3 GM/DL
GLUCOSE SERPL-MCNC: 100 MG/DL (ref 65–99)
HCT VFR BLD AUTO: 42.1 % (ref 34–46.6)
HDLC SERPL-MCNC: 44 MG/DL (ref 40–60)
HGB BLD-MCNC: 13.8 G/DL (ref 12–15.9)
IMM GRANULOCYTES # BLD AUTO: 0.01 10*3/MM3 (ref 0–0.05)
IMM GRANULOCYTES NFR BLD AUTO: 0.2 % (ref 0–0.5)
LDLC SERPL CALC-MCNC: 202 MG/DL (ref 0–100)
LDLC/HDLC SERPL: 4.54 {RATIO}
LYMPHOCYTES # BLD AUTO: 1.85 10*3/MM3 (ref 0.7–3.1)
LYMPHOCYTES NFR BLD AUTO: 32.9 % (ref 19.6–45.3)
MCH RBC QN AUTO: 28.4 PG (ref 26.6–33)
MCHC RBC AUTO-ENTMCNC: 32.8 G/DL (ref 31.5–35.7)
MCV RBC AUTO: 86.6 FL (ref 79–97)
MONOCYTES # BLD AUTO: 0.36 10*3/MM3 (ref 0.1–0.9)
MONOCYTES NFR BLD AUTO: 6.4 % (ref 5–12)
NEUTROPHILS NFR BLD AUTO: 3.22 10*3/MM3 (ref 1.7–7)
NEUTROPHILS NFR BLD AUTO: 57.3 % (ref 42.7–76)
NRBC BLD AUTO-RTO: 0 /100 WBC (ref 0–0.2)
PLATELET # BLD AUTO: 266 10*3/MM3 (ref 140–450)
PMV BLD AUTO: 9.6 FL (ref 6–12)
POTASSIUM SERPL-SCNC: 4.3 MMOL/L (ref 3.5–5.2)
PROT SERPL-MCNC: 7.1 G/DL (ref 6–8.5)
RBC # BLD AUTO: 4.86 10*6/MM3 (ref 3.77–5.28)
SODIUM SERPL-SCNC: 141 MMOL/L (ref 136–145)
TRIGL SERPL-MCNC: 141 MG/DL (ref 0–150)
TSH SERPL DL<=0.05 MIU/L-ACNC: 1.14 UIU/ML (ref 0.27–4.2)
VLDLC SERPL-MCNC: 26 MG/DL (ref 5–40)
WBC # BLD AUTO: 5.62 10*3/MM3 (ref 3.4–10.8)

## 2021-07-06 PROCEDURE — 86003 ALLG SPEC IGE CRUDE XTRC EA: CPT

## 2021-07-06 PROCEDURE — 82785 ASSAY OF IGE: CPT

## 2021-07-06 PROCEDURE — 36415 COLL VENOUS BLD VENIPUNCTURE: CPT

## 2021-07-06 PROCEDURE — 80053 COMPREHEN METABOLIC PANEL: CPT

## 2021-07-06 PROCEDURE — 84443 ASSAY THYROID STIM HORMONE: CPT

## 2021-07-06 PROCEDURE — 85025 COMPLETE CBC W/AUTO DIFF WBC: CPT

## 2021-07-06 PROCEDURE — 80061 LIPID PANEL: CPT

## 2021-07-12 ENCOUNTER — PATIENT MESSAGE (OUTPATIENT)
Dept: FAMILY MEDICINE CLINIC | Facility: CLINIC | Age: 61
End: 2021-07-12

## 2021-07-12 DIAGNOSIS — T78.40XD ALLERGY, SUBSEQUENT ENCOUNTER: Primary | ICD-10-CM

## 2021-07-12 LAB
A ALTERNATA IGE QN: 1.44 KU/L
A FUMIGATUS IGE QN: <0.1 KU/L
AMER ROACH IGE QN: 1.76 KU/L
BAHIA GRASS IGE QN: <0.1 KU/L
BARLEY IGE QN: NORMAL
BEEF IGE QN: NORMAL
BERMUDA GRASS IGE QN: 0.71 KU/L
BOXELDER IGE QN: <0.1 KU/L
C HERBARUM IGE QN: <0.1 KU/L
CABBAGE IGE QN: NORMAL
CARROT IGE QN: NORMAL
CAT DANDER IGE QN: <0.1 KU/L
CHICKEN MEAT IGE QN: NORMAL
CMN PIGWEED IGE QN: 0.1 KU/L
CODFISH IGE QN: NORMAL
COMMON RAGWEED IGE QN: 0.17 KU/L
CONV CLASS DESCRIPTION: ABNORMAL
CORN IGE QN: NORMAL
COW MILK IGE QN: NORMAL
CRAB IGE QN: NORMAL
D FARINAE IGE QN: 2.01 KU/L
D PTERONYSS IGE QN: 2.26 KU/L
DOG DANDER IGE QN: <0.1 KU/L
EGG WHITE IGE QN: NORMAL
ENGL PLANTAIN IGE QN: 0.28 KU/L
GRAPE IGE QN: NORMAL
GREEN PEPPER IGE QN: NORMAL
HAZELNUT POLN IGE QN: <0.1 KU/L
IGE SERPL-ACNC: 590 IU/ML (ref 6–495)
JOHNSON GRASS IGE QN: <0.1 KU/L
KENT BLUE GRASS IGE QN: <0.1 KU/L
LETTUCE IGE QN: NORMAL
LONDON PLANE IGE QN: <0.1 KU/L
M RACEMOSUS IGE QN: <0.1 KU/L
MT JUNIPER IGE QN: 0.23 KU/L
MUGWORT IGE QN: 0.22 KU/L
NETTLE IGE QN: ABNORMAL
OAT IGE QN: NORMAL
ORANGE IGE QN: NORMAL
P NOTATUM IGE QN: <0.1 KU/L
PEANUT IGE QN: NORMAL
PORK IGE QN: NORMAL
POTATO IGE QN: NORMAL
RICE IGE QN: NORMAL
RYE IGE QN: NORMAL
S BOTRYOSUM IGE QN: 0.71 KU/L
SHEEP SORREL IGE QN: 0.12 KU/L
SHRIMP IGE QN: NORMAL
SOYBEAN IGE QN: NORMAL
SWEET GUM IGE QN: ABNORMAL
TOMATO IGE QN: NORMAL
TUNA IGE QN: NORMAL
WHEAT IGE QN: NORMAL
WHITE BEAN IGE QN: NORMAL
WHITE ELM IGE QN: <0.1 KU/L
WHITE HICKORY IGE QN: <0.1 KU/L
WHITE MULBERRY IGE QN: <0.1 KU/L
WHITE OAK IGE QN: 0.12 KU/L

## 2021-07-15 VITALS
HEART RATE: 56 BPM | BODY MASS INDEX: 25.58 KG/M2 | SYSTOLIC BLOOD PRESSURE: 128 MMHG | WEIGHT: 139 LBS | HEIGHT: 62 IN | DIASTOLIC BLOOD PRESSURE: 72 MMHG

## 2021-07-16 ENCOUNTER — LAB (OUTPATIENT)
Dept: LAB | Facility: HOSPITAL | Age: 61
End: 2021-07-16

## 2021-07-16 DIAGNOSIS — T78.40XD ALLERGY, SUBSEQUENT ENCOUNTER: ICD-10-CM

## 2021-07-16 PROCEDURE — 86003 ALLG SPEC IGE CRUDE XTRC EA: CPT

## 2021-07-16 PROCEDURE — 36415 COLL VENOUS BLD VENIPUNCTURE: CPT

## 2021-07-22 LAB
CLAM IGE QN: <0.1 KU/L
CODFISH IGE QN: <0.1 KU/L
CONV CLASS DESCRIPTION: ABNORMAL
CORN IGE QN: <0.1 KU/L
COW MILK IGE QN: <0.1 KU/L
EGG WHITE IGE QN: <0.1 KU/L
PEANUT IGE QN: <0.1 KU/L
SCALLOP IGE QN: <0.1 KU/L
SESAME SEED IGE QN: <0.1 KU/L
SHRIMP IGE QN: 1.63 KU/L
SOYBEAN IGE QN: <0.1 KU/L
WALNUT IGE QN: <0.1 KU/L
WHEAT IGE QN: 0.2 KU/L

## 2021-07-26 ENCOUNTER — LAB (OUTPATIENT)
Dept: LAB | Facility: HOSPITAL | Age: 61
End: 2021-07-26

## 2021-07-26 ENCOUNTER — OFFICE VISIT (OUTPATIENT)
Dept: FAMILY MEDICINE CLINIC | Facility: CLINIC | Age: 61
End: 2021-07-26

## 2021-07-26 VITALS
WEIGHT: 139 LBS | HEART RATE: 65 BPM | OXYGEN SATURATION: 98 % | TEMPERATURE: 98.5 F | RESPIRATION RATE: 16 BRPM | SYSTOLIC BLOOD PRESSURE: 124 MMHG | DIASTOLIC BLOOD PRESSURE: 64 MMHG | BODY MASS INDEX: 25.58 KG/M2 | HEIGHT: 62 IN

## 2021-07-26 DIAGNOSIS — Z83.3 FAMILY HISTORY OF DIABETES MELLITUS: ICD-10-CM

## 2021-07-26 DIAGNOSIS — Z91.018 ALLERGY TO WHEAT: ICD-10-CM

## 2021-07-26 DIAGNOSIS — Z01.419 ENCOUNTER FOR WELL WOMAN EXAM WITH ROUTINE GYNECOLOGICAL EXAM: ICD-10-CM

## 2021-07-26 DIAGNOSIS — Z12.11 SCREENING FOR COLON CANCER: ICD-10-CM

## 2021-07-26 DIAGNOSIS — Z83.79 FAMILY HISTORY OF CELIAC DISEASE: ICD-10-CM

## 2021-07-26 DIAGNOSIS — E03.9 HYPOTHYROIDISM, UNSPECIFIED TYPE: ICD-10-CM

## 2021-07-26 DIAGNOSIS — T75.3XXS MOTION SICKNESS, SEQUELA: Primary | ICD-10-CM

## 2021-07-26 PROBLEM — G43.909 MIGRAINES: Status: ACTIVE | Noted: 2021-07-26

## 2021-07-26 PROBLEM — E07.9 THYROID DISORDER: Status: ACTIVE | Noted: 2021-07-26

## 2021-07-26 PROBLEM — M25.819 AFFECTIONS OF SHOULDER REGION: Status: ACTIVE | Noted: 2017-07-28

## 2021-07-26 PROBLEM — M22.2X9 PATELLOFEMORAL PAIN SYNDROME: Status: ACTIVE | Noted: 2017-07-31

## 2021-07-26 LAB
DEVELOPER EXPIRATION DATE: NORMAL
DEVELOPER LOT NUMBER: NORMAL
EXPIRATION DATE: NORMAL
FECAL OCCULT BLOOD SCREEN, POC: NEGATIVE
Lab: NORMAL
NEGATIVE CONTROL: NEGATIVE
POSITIVE CONTROL: POSITIVE

## 2021-07-26 PROCEDURE — 82784 ASSAY IGA/IGD/IGG/IGM EACH: CPT

## 2021-07-26 PROCEDURE — 83516 IMMUNOASSAY NONANTIBODY: CPT

## 2021-07-26 PROCEDURE — 82270 OCCULT BLOOD FECES: CPT | Performed by: NURSE PRACTITIONER

## 2021-07-26 PROCEDURE — 99396 PREV VISIT EST AGE 40-64: CPT | Performed by: NURSE PRACTITIONER

## 2021-07-26 PROCEDURE — 36415 COLL VENOUS BLD VENIPUNCTURE: CPT

## 2021-07-26 RX ORDER — METRONIDAZOLE 10 MG/G
GEL TOPICAL
COMMUNITY

## 2021-07-26 RX ORDER — LEVOTHYROXINE SODIUM 125 MCG
62.5 TABLET ORAL DAILY
COMMUNITY
Start: 2021-06-01 | End: 2021-08-06

## 2021-07-26 RX ORDER — SCOLOPAMINE TRANSDERMAL SYSTEM 1 MG/1
1 PATCH, EXTENDED RELEASE TRANSDERMAL
Qty: 4 PATCH | Refills: 1 | Status: SHIPPED | OUTPATIENT
Start: 2021-07-26 | End: 2022-06-30

## 2021-07-26 RX ORDER — SCOLOPAMINE TRANSDERMAL SYSTEM 1 MG/1
1 PATCH, EXTENDED RELEASE TRANSDERMAL
COMMUNITY
End: 2021-07-26 | Stop reason: SDUPTHER

## 2021-07-26 RX ORDER — ZOLMITRIPTAN 5 MG/1
5 TABLET, FILM COATED ORAL ONCE AS NEEDED
COMMUNITY
End: 2022-06-30

## 2021-07-26 NOTE — PROGRESS NOTES
Chief Complaint  Allergies (results), Wrist Pain (bilateral - sees specialist), and Gynecologic Exam    Subjective        Celeste Bui presents to [facility]  Here for yearly gynecological exam.    Wrist pain:  Pain hands carpal tunnel and shots have helped.  And has been doing well after shots at hand specialty.    Allergies:  Will give a name she would like to see. 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 States if she has a scratch will cause a hive.  Wasn't able to have scratch testing years ago due to this.  She knows of a specialist in [place] to see.    Allergies  Pertinent negatives include no chest pain, coughing, fever, numbness or weakness.   Wrist Pain   Pertinent negatives include no fever or numbness.   Gynecologic Exam  Pertinent negatives include no fever.         Past History:    Medical History: has a past medical history of Allergies, Hypothyroidism, Impingement syndrome of shoulder, right (2017), Migraines, Patellofemoral syndrome of right knee (2017), and Thyroid disorder.     Surgical History: has a past surgical history that includes Breast biopsy ();  section; Colonoscopy; Hysterectomy (); and Other surgical history ().     Family History: family history includes Diabetes in her father; Lung cancer in an other family member; Throat cancer in an other family member.     Social History: reports that she has never smoked. She has never used smokeless tobacco. She reports that she does not drink alcohol and does not use drugs.    Allergies: Patient has no allergy information on record.          Current Outpatient Medications:   •  metroNIDAZOLE (Metrogel) 1 % gel, Metrogel 1 % topical gel apply to the affected area(s) by topical route once daily ; rub in gently and completely   Active, Disp: , Rfl:   •  Scopolamine 1 MG/3DAYS patch, Place 1 patch on the skin as directed by provider Every 72 (Seventy-Two) Hours.,  "Disp: 4 patch, Rfl: 1  •  Synthroid 125 MCG tablet, Take 62.5 mcg by mouth Daily., Disp: , Rfl:   •  ZOLMitriptan (Zomig) 5 MG tablet, Take 5 mg by mouth 1 (One) Time As Needed for Migraine., Disp: , Rfl:     Medications Discontinued During This Encounter   Medication Reason   • Scopolamine 1 MG/3DAYS patch Reorder         Review of Systems   Constitutional: Negative for fever.   Respiratory: Negative for cough and shortness of breath.    Cardiovascular: Negative for chest pain, palpitations and leg swelling.   Neurological: Negative for dizziness, weakness, numbness and headache.        Objective         Vitals:    07/26/21 0759   BP: 124/64   BP Location: Right arm   Patient Position: Sitting   Cuff Size: Adult   Pulse: 65   Resp: 16   Temp: 98.5 °F (36.9 °C)   TempSrc: Infrared   SpO2: 98%   Weight: 63 kg (139 lb)   Height: 157.5 cm (62\")     Body mass index is 25.42 kg/m².         Physical Exam  Vitals reviewed.   Constitutional:       Appearance: Normal appearance. She is well-developed.   HENT:      Head: Normocephalic and atraumatic.      Mouth/Throat:      Pharynx: No oropharyngeal exudate.   Eyes:      Conjunctiva/sclera: Conjunctivae normal.      Pupils: Pupils are equal, round, and reactive to light.   Neck:      Thyroid: No thyroid mass, thyromegaly or thyroid tenderness.   Cardiovascular:      Rate and Rhythm: Normal rate and regular rhythm.      Heart sounds: No murmur heard.   No friction rub. No gallop.    Pulmonary:      Effort: Pulmonary effort is normal.      Breath sounds: Normal breath sounds. No wheezing or rhonchi.   Abdominal:      General: Bowel sounds are normal. There is no distension.      Palpations: Abdomen is soft.      Tenderness: There is no abdominal tenderness. There is no guarding.      Hernia: There is no hernia in the left inguinal area or right inguinal area.   Genitourinary:     Pubic Area: No rash.       Labia:         Right: No rash or injury.         Left: No rash or " injury.       Urethra: No prolapse.      Vagina: Normal.      Uterus: Absent.       Adnexa: Right adnexa normal and left adnexa normal.      Rectum: Normal.   Musculoskeletal:         General: Normal range of motion.      Cervical back: Normal range of motion.   Skin:     General: Skin is warm and dry.      Capillary Refill: Capillary refill takes less than 2 seconds.   Neurological:      Mental Status: She is alert and oriented to person, place, and time.      Cranial Nerves: No cranial nerve deficit.   Psychiatric:         Mood and Affect: Mood and affect normal.         Behavior: Behavior normal.         Thought Content: Thought content normal.         Judgment: Judgment normal.             Result Review :               Assessment and Plan     Diagnoses and all orders for this visit:    1. Motion sickness, sequela (Primary)  -     Scopolamine 1 MG/3DAYS patch; Place 1 patch on the skin as directed by provider Every 72 (Seventy-Two) Hours.  Dispense: 4 patch; Refill: 1    2. Allergy to wheat  -     Celiac Panel Reflex To Titer; Future    3. Family history of celiac disease  -     Celiac Panel Reflex To Titer; Future    4. Family history of diabetes mellitus  -     Hemoglobin A1c; Future    5. Encounter for well woman exam with routine gynecological exam  -     Comprehensive metabolic panel; Future  -     Lipid Panel w/ Chol/HDL Ratio; Future  -     Urinalysis With Culture If Indicated -; Future  -     CBC No Differential; Future    6. Hypothyroidism, unspecified type  -     TSH; Future  -     T3, free; Future    7. Screening for colon cancer  -     POCT occult blood x 1 stool          Reviewed immunizations with patient.  Discussed dietary changes that may help with allergies.    Follow Up     Return in about 1 year (around 7/26/2022).    Patient was given instructions and counseling regarding her condition or for health maintenance advice. Please see specific information pulled into the AVS if appropriate.

## 2021-07-27 LAB
GLIADIN PEPTIDE IGA SER-ACNC: 11 UNITS (ref 0–19)
IGA SERPL-MCNC: 474 MG/DL (ref 87–352)
TTG IGA SER-ACNC: <2 U/ML (ref 0–3)

## 2021-07-28 ENCOUNTER — PATIENT MESSAGE (OUTPATIENT)
Dept: FAMILY MEDICINE CLINIC | Facility: CLINIC | Age: 61
End: 2021-07-28

## 2021-07-28 DIAGNOSIS — T78.40XD ALLERGY, SUBSEQUENT ENCOUNTER: Primary | ICD-10-CM

## 2021-07-30 NOTE — TELEPHONE ENCOUNTER
From: Celeste Bui  To: LORI Barrientos  Sent: 7/28/2021 7:45 AM EDT  Subject: Test Results Question    Stephen could you please call me to discuss options. The practice that I was referring to is a [facility] Nutritionist/Dietitian. Sheryl Dewitt [phone]. Not sure if that is the best option. My number [phone]

## 2021-08-06 RX ORDER — LEVOTHYROXINE SODIUM 125 MCG
TABLET ORAL
Qty: 45 TABLET | Refills: 3 | Status: SHIPPED | OUTPATIENT
Start: 2021-08-06 | End: 2022-06-06

## 2021-08-27 ENCOUNTER — PATIENT MESSAGE (OUTPATIENT)
Dept: FAMILY MEDICINE CLINIC | Facility: CLINIC | Age: 61
End: 2021-08-27

## 2021-08-27 DIAGNOSIS — T78.40XD ALLERGY, SUBSEQUENT ENCOUNTER: Primary | ICD-10-CM

## 2021-08-27 NOTE — TELEPHONE ENCOUNTER
From: Celeste Bui  To: LORI Barrientos  Sent: 8/27/2021 10:17 AM EDT  Subject: Non-Urgent Medical Question    Could you please order a lab for IGE. I would like to retake in about three weeks as I will have not eaten wheat for 8 weeks by then.    Thanks, Chyna

## 2021-09-22 ENCOUNTER — LAB (OUTPATIENT)
Dept: LAB | Facility: HOSPITAL | Age: 61
End: 2021-09-22

## 2021-09-22 DIAGNOSIS — T78.40XD ALLERGY, SUBSEQUENT ENCOUNTER: ICD-10-CM

## 2021-09-22 PROCEDURE — 82785 ASSAY OF IGE: CPT

## 2021-09-22 PROCEDURE — 36415 COLL VENOUS BLD VENIPUNCTURE: CPT

## 2021-09-23 LAB — IGE SERPL-ACNC: 539 KU/L

## 2022-01-17 ENCOUNTER — APPOINTMENT (OUTPATIENT)
Dept: WOMENS IMAGING | Facility: HOSPITAL | Age: 62
End: 2022-01-17

## 2022-01-17 PROCEDURE — 77067 SCR MAMMO BI INCL CAD: CPT | Performed by: RADIOLOGY

## 2022-01-17 PROCEDURE — 77063 BREAST TOMOSYNTHESIS BI: CPT | Performed by: RADIOLOGY

## 2022-06-06 RX ORDER — LEVOTHYROXINE SODIUM 125 MCG
TABLET ORAL
Qty: 45 TABLET | Refills: 1 | Status: SHIPPED | OUTPATIENT
Start: 2022-06-06 | End: 2022-12-27

## 2022-07-19 ENCOUNTER — LAB (OUTPATIENT)
Dept: LAB | Facility: HOSPITAL | Age: 62
End: 2022-07-19

## 2022-07-19 DIAGNOSIS — Z01.419 ENCOUNTER FOR WELL WOMAN EXAM WITH ROUTINE GYNECOLOGICAL EXAM: ICD-10-CM

## 2022-07-19 DIAGNOSIS — E03.9 HYPOTHYROIDISM, UNSPECIFIED TYPE: ICD-10-CM

## 2022-07-19 DIAGNOSIS — Z83.3 FAMILY HISTORY OF DIABETES MELLITUS: ICD-10-CM

## 2022-07-19 LAB
ALBUMIN SERPL-MCNC: 4.1 G/DL (ref 3.5–5.2)
ALBUMIN/GLOB SERPL: 1.4 G/DL
ALP SERPL-CCNC: 75 U/L (ref 39–117)
ALT SERPL W P-5'-P-CCNC: 12 U/L (ref 1–33)
ANION GAP SERPL CALCULATED.3IONS-SCNC: 8.2 MMOL/L (ref 5–15)
AST SERPL-CCNC: 19 U/L (ref 1–32)
BILIRUB SERPL-MCNC: 0.9 MG/DL (ref 0–1.2)
BILIRUB UR QL STRIP: NEGATIVE
BUN SERPL-MCNC: 11 MG/DL (ref 8–23)
BUN/CREAT SERPL: 14.5 (ref 7–25)
CALCIUM SPEC-SCNC: 9.5 MG/DL (ref 8.6–10.5)
CHLORIDE SERPL-SCNC: 106 MMOL/L (ref 98–107)
CHOLEST SERPL-MCNC: 279 MG/DL (ref 0–200)
CLARITY UR: CLEAR
CO2 SERPL-SCNC: 26.8 MMOL/L (ref 22–29)
COLOR UR: YELLOW
CREAT SERPL-MCNC: 0.76 MG/DL (ref 0.57–1)
DEPRECATED RDW RBC AUTO: 42 FL (ref 37–54)
EGFRCR SERPLBLD CKD-EPI 2021: 88.7 ML/MIN/1.73
ERYTHROCYTE [DISTWIDTH] IN BLOOD BY AUTOMATED COUNT: 13.4 % (ref 12.3–15.4)
GLOBULIN UR ELPH-MCNC: 3 GM/DL
GLUCOSE SERPL-MCNC: 85 MG/DL (ref 65–99)
GLUCOSE UR STRIP-MCNC: NEGATIVE MG/DL
HBA1C MFR BLD: 5.6 % (ref 4.8–5.6)
HCT VFR BLD AUTO: 42.5 % (ref 34–46.6)
HDLC SERPL QL: 5.69
HDLC SERPL-MCNC: 49 MG/DL (ref 40–60)
HGB BLD-MCNC: 13.9 G/DL (ref 12–15.9)
HGB UR QL STRIP.AUTO: NEGATIVE
KETONES UR QL STRIP: NEGATIVE
LDLC SERPL CALC-MCNC: 204 MG/DL (ref 0–100)
LEUKOCYTE ESTERASE UR QL STRIP.AUTO: NEGATIVE
MCH RBC QN AUTO: 28.2 PG (ref 26.6–33)
MCHC RBC AUTO-ENTMCNC: 32.7 G/DL (ref 31.5–35.7)
MCV RBC AUTO: 86.2 FL (ref 79–97)
NITRITE UR QL STRIP: NEGATIVE
PH UR STRIP.AUTO: 6.5 [PH] (ref 5–8)
PLATELET # BLD AUTO: 289 10*3/MM3 (ref 140–450)
PMV BLD AUTO: 9.6 FL (ref 6–12)
POTASSIUM SERPL-SCNC: 4 MMOL/L (ref 3.5–5.2)
PROT SERPL-MCNC: 7.1 G/DL (ref 6–8.5)
PROT UR QL STRIP: NEGATIVE
RBC # BLD AUTO: 4.93 10*6/MM3 (ref 3.77–5.28)
SODIUM SERPL-SCNC: 141 MMOL/L (ref 136–145)
SP GR UR STRIP: 1.01 (ref 1–1.03)
T3FREE SERPL-MCNC: 2.5 PG/ML (ref 2–4.4)
TRIGL SERPL-MCNC: 141 MG/DL (ref 0–150)
TSH SERPL DL<=0.05 MIU/L-ACNC: 0.73 UIU/ML (ref 0.27–4.2)
UROBILINOGEN UR QL STRIP: NORMAL
VLDLC SERPL-MCNC: 26 MG/DL (ref 5–40)
WBC NRBC COR # BLD: 5.98 10*3/MM3 (ref 3.4–10.8)

## 2022-07-19 PROCEDURE — 80050 GENERAL HEALTH PANEL: CPT

## 2022-07-19 PROCEDURE — 84481 FREE ASSAY (FT-3): CPT

## 2022-07-19 PROCEDURE — 80061 LIPID PANEL: CPT

## 2022-07-19 PROCEDURE — 83036 HEMOGLOBIN GLYCOSYLATED A1C: CPT

## 2022-07-19 PROCEDURE — 81003 URINALYSIS AUTO W/O SCOPE: CPT

## 2022-07-19 PROCEDURE — 36415 COLL VENOUS BLD VENIPUNCTURE: CPT

## 2022-07-27 ENCOUNTER — OFFICE VISIT (OUTPATIENT)
Dept: FAMILY MEDICINE CLINIC | Facility: CLINIC | Age: 62
End: 2022-07-27

## 2022-07-27 VITALS
DIASTOLIC BLOOD PRESSURE: 74 MMHG | WEIGHT: 135.8 LBS | HEART RATE: 87 BPM | HEIGHT: 62 IN | BODY MASS INDEX: 24.99 KG/M2 | TEMPERATURE: 97.3 F | SYSTOLIC BLOOD PRESSURE: 110 MMHG | OXYGEN SATURATION: 98 % | RESPIRATION RATE: 16 BRPM

## 2022-07-27 DIAGNOSIS — E07.9 THYROID DISORDER: ICD-10-CM

## 2022-07-27 DIAGNOSIS — Z00.00 ANNUAL PHYSICAL EXAM: ICD-10-CM

## 2022-07-27 DIAGNOSIS — Z12.31 ENCOUNTER FOR SCREENING MAMMOGRAM FOR MALIGNANT NEOPLASM OF BREAST: ICD-10-CM

## 2022-07-27 DIAGNOSIS — E78.2 MIXED HYPERLIPIDEMIA: ICD-10-CM

## 2022-07-27 DIAGNOSIS — Z11.59 NEED FOR HEPATITIS C SCREENING TEST: Primary | ICD-10-CM

## 2022-07-27 PROCEDURE — 99396 PREV VISIT EST AGE 40-64: CPT | Performed by: NURSE PRACTITIONER

## 2022-07-27 NOTE — PROGRESS NOTES
Chief Complaint  Annual Exam    Subjective        Celeste Bui presents to [facility]  Annual exam:  Exercises regularly.    Thyroid issues well controlled.    Hyperlipidemia:  Discussed medication but at 4% risk she doesn't want to be on medication.    Not having any issues .  Has had a partial hysterectomy.        Past History:    Medical History: has a past medical history of Allergies, Hypothyroidism, Impingement syndrome of shoulder, right (2017), Migraines, Patellofemoral syndrome of right knee (2017), and Thyroid disorder.     Surgical History: has a past surgical history that includes Breast biopsy ();  section; Colonoscopy; Hysterectomy (); Other surgical history (); and Carpal tunnel release (Bilateral, 2022).     Family History: family history includes Diabetes in her father; Lung cancer in an other family member; Throat cancer in an other family member.     Social History: reports that she has never smoked. She has never used smokeless tobacco. She reports that she does not drink alcohol and does not use drugs.    Allergies: Sulfa antibiotics          Current Outpatient Medications:   •  metroNIDAZOLE (METROGEL) 1 % gel, Metrogel 1 % topical gel apply to the affected area(s) by topical route once daily ; rub in gently and completely   Active, Disp: , Rfl:   •  Synthroid 125 MCG tablet, TAKE 1/2 TABLET BY MOUTH ONCE DAILY, Disp: 45 tablet, Rfl: 1    There are no discontinued medications.      Review of Systems   Constitutional: Negative for fever.   Respiratory: Negative for cough and shortness of breath.    Cardiovascular: Negative for chest pain, palpitations and leg swelling.   Neurological: Negative for dizziness, weakness, numbness and headache.        Objective         Vitals:    22 0759   BP: 110/74   BP Location: Right arm   Patient Position: Sitting   Cuff Size: Adult   Pulse: 87   Resp: 16   Temp: 97.3 °F (36.3  "°C)   TempSrc: Infrared   SpO2: 98%   Weight: 61.6 kg (135 lb 12.8 oz)   Height: 157.5 cm (62.01\")     Body mass index is 24.83 kg/m².         Physical Exam  Vitals reviewed.   Constitutional:       Appearance: Normal appearance. She is well-developed.   HENT:      Head: Normocephalic and atraumatic.      Mouth/Throat:      Pharynx: No oropharyngeal exudate.   Eyes:      Conjunctiva/sclera: Conjunctivae normal.      Pupils: Pupils are equal, round, and reactive to light.   Cardiovascular:      Rate and Rhythm: Normal rate and regular rhythm.      Heart sounds: Normal heart sounds. No murmur heard.    No friction rub. No gallop.   Pulmonary:      Effort: Pulmonary effort is normal.      Breath sounds: Normal breath sounds. No wheezing or rhonchi.   Skin:     General: Skin is warm and dry.   Neurological:      Mental Status: She is alert and oriented to person, place, and time.   Psychiatric:         Mood and Affect: Mood and affect normal.         Behavior: Behavior normal.         Thought Content: Thought content normal.         Judgment: Judgment normal.             Result Review :               Assessment and Plan     Diagnoses and all orders for this visit:    1. Need for hepatitis C screening test (Primary)  -     Hepatitis C antibody; Future    2. Thyroid disorder    3. Mixed hyperlipidemia  Comments:  Encouraged to take fish oil or red yeast rice.    4. Encounter for screening mammogram for malignant neoplasm of breast  -     Mammo Screening Digital Tomosynthesis Bilateral With CAD; Future    5. Annual physical exam  -     Comprehensive Metabolic Panel; Future  -     Lipid Panel With / Chol / HDL Ratio; Future  -     Urinalysis With Culture If Indicated -; Future  -     CBC & Differential; Future  -     TSH Rfx On Abnormal To Free T4; Future              Follow Up     Return in about 1 year (around 7/27/2023).    Patient was given instructions and counseling regarding her condition or for health maintenance " advice. Please see specific information pulled into the AVS if appropriate.

## 2022-12-27 RX ORDER — LEVOTHYROXINE SODIUM 125 MCG
TABLET ORAL
Qty: 45 TABLET | Refills: 1 | Status: SHIPPED | OUTPATIENT
Start: 2022-12-27

## 2023-01-19 ENCOUNTER — HOSPITAL ENCOUNTER (OUTPATIENT)
Dept: MAMMOGRAPHY | Facility: HOSPITAL | Age: 63
Discharge: HOME | End: 2023-01-19
Admitting: NURSE PRACTITIONER
Payer: COMMERCIAL

## 2023-01-19 DIAGNOSIS — Z12.31 ENCOUNTER FOR SCREENING MAMMOGRAM FOR MALIGNANT NEOPLASM OF BREAST: ICD-10-CM

## 2023-01-19 PROCEDURE — 77067 SCR MAMMO BI INCL CAD: CPT

## 2023-01-19 PROCEDURE — 77063 BREAST TOMOSYNTHESIS BI: CPT

## 2023-01-20 DIAGNOSIS — R92.8 ABNORMAL MAMMOGRAM OF LEFT BREAST: Primary | ICD-10-CM

## 2023-02-01 ENCOUNTER — HOSPITAL ENCOUNTER (OUTPATIENT)
Dept: ULTRASOUND IMAGING | Facility: HOSPITAL | Age: 63
Discharge: HOME | End: 2023-02-01
Payer: COMMERCIAL

## 2023-02-01 ENCOUNTER — HOSPITAL ENCOUNTER (OUTPATIENT)
Dept: MAMMOGRAPHY | Facility: HOSPITAL | Age: 63
Discharge: HOME | End: 2023-02-01
Payer: COMMERCIAL

## 2023-02-01 DIAGNOSIS — R92.8 ABNORMAL MAMMOGRAM OF LEFT BREAST: ICD-10-CM

## 2023-02-01 PROCEDURE — G0279 TOMOSYNTHESIS, MAMMO: HCPCS

## 2023-02-01 PROCEDURE — 77065 DX MAMMO INCL CAD UNI: CPT

## 2023-07-27 ENCOUNTER — OFFICE VISIT (OUTPATIENT)
Dept: FAMILY MEDICINE CLINIC | Facility: CLINIC | Age: 63
End: 2023-07-27
Payer: COMMERCIAL

## 2023-07-27 VITALS
WEIGHT: 138 LBS | HEART RATE: 62 BPM | RESPIRATION RATE: 16 BRPM | BODY MASS INDEX: 25.4 KG/M2 | SYSTOLIC BLOOD PRESSURE: 116 MMHG | TEMPERATURE: 96.4 F | OXYGEN SATURATION: 98 % | HEIGHT: 62 IN | DIASTOLIC BLOOD PRESSURE: 74 MMHG

## 2023-07-27 DIAGNOSIS — Z78.0 POSTMENOPAUSAL: ICD-10-CM

## 2023-07-27 DIAGNOSIS — E07.9 THYROID DISORDER: ICD-10-CM

## 2023-07-27 DIAGNOSIS — R19.5 CHANGE IN CONSISTENCY OF STOOL: ICD-10-CM

## 2023-07-27 DIAGNOSIS — Z12.11 COLON CANCER SCREENING: ICD-10-CM

## 2023-07-27 DIAGNOSIS — Z00.00 ANNUAL PHYSICAL EXAM: Primary | ICD-10-CM

## 2023-07-27 DIAGNOSIS — Z12.31 ENCOUNTER FOR SCREENING MAMMOGRAM FOR MALIGNANT NEOPLASM OF BREAST: ICD-10-CM

## 2023-07-27 LAB
DEVELOPER EXPIRATION DATE: NORMAL
DEVELOPER LOT NUMBER: NORMAL
EXPIRATION DATE: NORMAL
FECAL OCCULT BLOOD SCREEN, POC: NEGATIVE
Lab: 222
NEGATIVE CONTROL: NEGATIVE
POSITIVE CONTROL: POSITIVE

## 2023-07-27 PROCEDURE — 99396 PREV VISIT EST AGE 40-64: CPT | Performed by: NURSE PRACTITIONER

## 2023-07-27 PROCEDURE — 82270 OCCULT BLOOD FECES: CPT | Performed by: NURSE PRACTITIONER

## 2023-07-27 RX ORDER — LEVOTHYROXINE SODIUM 125 MCG
62.5 TABLET ORAL DAILY
Qty: 62 TABLET | Refills: 1 | Status: SHIPPED | OUTPATIENT
Start: 2023-07-27

## 2023-07-27 NOTE — PROGRESS NOTES
Chief Complaint  Annual Exam, Hypothyroidism, and possible cysts on labia    Subjective        Celeste Bui presents to [facility]  History of Present Illness  Annual exam:  Exercises regularly.    Thyroid issues well controlled.  Taking 1/2 a tab 6 days and a whole on the .    Hyperlipidemia:  Discussed medication but at 4% risk she doesn't want to be on medication.    Not having any issues .  Has had a partial hysterectomy.    Has had a change in bowel movements and states has had some changes.  Has been taking stool softeners and fiber and not helping.  Drinking 90 ounces of fluid a day and still having hard pellets and when strains has to support the perineum.  Even stool softeners hasn't helped any longer.    Cysts on labia and has started having more. Has had some over the last 10 years.  Hypothyroidism  Pertinent negatives include no chest pain, coughing, fever, numbness or weakness.     The following portions of the patient's history were personally reviewed and updated as appropriate: allergies, current medications, past medical history, past surgical history, past family history, and past social history.     Body mass index is 25.23 kg/m².    BMI is >= 25 and <30. (Overweight) The following options were offered after discussion;: weight loss educational material (shared in after visit summary)      Past History:    Medical History: has a past medical history of Allergies, Hypothyroidism, Impingement syndrome of shoulder, right (2017), Migraines, Patellofemoral syndrome of right knee (2017), and Thyroid disorder.     Surgical History: has a past surgical history that includes Breast biopsy ();  section; Colonoscopy; Hysterectomy (); Other surgical history (); and Carpal tunnel release (Bilateral, 2022).     Family History: family history includes Diabetes in her father; Lung cancer in an other family member; Throat cancer in an  "other family member.     Social History: reports that she has never smoked. She has never used smokeless tobacco. She reports that she does not drink alcohol and does not use drugs.    Allergies: Sulfa antibiotics          Current Outpatient Medications:     Synthroid 125 MCG tablet, Take 0.5 tablets by mouth Daily. For 6 day and a full tablet on 7th day., Disp: 62 tablet, Rfl: 1    Medications Discontinued During This Encounter   Medication Reason    metroNIDAZOLE (METROGEL) 1 % gel *Therapy completed    Synthroid 125 MCG tablet Reorder         Review of Systems   Constitutional:  Negative for fever.   Respiratory:  Negative for cough and shortness of breath.    Cardiovascular:  Negative for chest pain, palpitations and leg swelling.   Neurological:  Negative for dizziness, weakness, numbness and headache.      Objective         Vitals:    07/27/23 0652   BP: 116/74   BP Location: Right arm   Patient Position: Sitting   Cuff Size: Adult   Pulse: 62   Resp: 16   Temp: 96.4 °F (35.8 °C)   TempSrc: Temporal   SpO2: 98%   Weight: 62.6 kg (138 lb)   Height: 157.5 cm (62.01\")     Body mass index is 25.23 kg/m².         Physical Exam  Vitals reviewed.   Constitutional:       Appearance: Normal appearance. She is well-developed.   HENT:      Head: Normocephalic and atraumatic.      Mouth/Throat:      Pharynx: No oropharyngeal exudate.   Eyes:      Conjunctiva/sclera: Conjunctivae normal.      Pupils: Pupils are equal, round, and reactive to light.   Cardiovascular:      Rate and Rhythm: Normal rate and regular rhythm.      Heart sounds: Normal heart sounds. No murmur heard.    No friction rub. No gallop.   Pulmonary:      Effort: Pulmonary effort is normal.      Breath sounds: Normal breath sounds. No wheezing or rhonchi.   Skin:     General: Skin is warm and dry.   Neurological:      Mental Status: She is alert and oriented to person, place, and time.   Psychiatric:         Mood and Affect: Mood and affect normal.         " Behavior: Behavior normal.         Thought Content: Thought content normal.         Judgment: Judgment normal.           Result Review :               Assessment and Plan     Diagnoses and all orders for this visit:    1. Annual physical exam (Primary)  Comments:  discussed diet and exercise.  Orders:  -     Lipid Panel With LDL / HDL Ratio; Future  -     Comprehensive Metabolic Panel; Future  -     Urinalysis With Culture If Indicated -; Future  -     CBC & Differential; Future  -     TSH Rfx On Abnormal To Free T4; Future  -     Hemoglobin A1c; Future    2. Thyroid disorder  -     Synthroid 125 MCG tablet; Take 0.5 tablets by mouth Daily. For 6 day and a full tablet on 7th day.  Dispense: 62 tablet; Refill: 1    3. Postmenopausal  -     DEXA Bone Density Axial; Future    4. Encounter for screening mammogram for malignant neoplasm of breast  -     Mammo Screening Digital Tomosynthesis Bilateral With CAD; Future    5. Change in consistency of stool  -     Ambulatory Referral For Screening Colonoscopy    6. Colon cancer screening  -     POCT occult blood x 1 stool      Denies blood in stool.        Follow Up     No follow-ups on file.    Patient was given instructions and counseling regarding her condition or for health maintenance advice. Please see specific information pulled into the AVS if appropriate.

## 2023-08-04 DIAGNOSIS — R19.5 CHANGE IN CONSISTENCY OF STOOL: Primary | ICD-10-CM

## 2023-09-01 ENCOUNTER — TELEPHONE (OUTPATIENT)
Dept: GASTROENTEROLOGY | Facility: CLINIC | Age: 63
End: 2023-09-01
Payer: COMMERCIAL

## 2023-09-01 NOTE — TELEPHONE ENCOUNTER
"  Hub staff attempted to follow warm transfer process and was unsuccessful     Caller: Celeste Bui \"Chyna\"    Relationship to patient: Self    Best call back number: [phone]  CAN CALL ANY TIME    Patient is needing: PATIENT STATES THAT SHE MISSED A CALL FROM THE OFFICE BUT DID NOT KNOW WHO CALLED OR WHY THEY WERE CALLING. PATIENT STATED THAT HER  ANSWERED THE PHONE AND TOLD THE OFFICE THAT PATIENT WOULD CALL BACK.     [facility] DID NOT SEE ANY PRIOR NOTES IN CHART OR ON PATIENT'S APPT.     PLEASE CALL PATIENT BACK.         "

## 2023-09-01 NOTE — TELEPHONE ENCOUNTER
Krista attempted to contact the patient to offer an earlier appointment, the patient's  stated that the patient would have to call back in 10-15 minutes.

## 2023-09-20 NOTE — TELEPHONE ENCOUNTER
Patient came into the office to see about the call she had received. I advised that the office attempted to call to offer an earlier appointment than the one scheduled in January but advised that I would add her to my list to be contacted if I had anything else come up sooner, patient verbalized understanding and stated that I could leave a detailed voicemail if I was unable to reach her.

## 2023-09-27 ENCOUNTER — CLINICAL SUPPORT (OUTPATIENT)
Dept: FAMILY MEDICINE CLINIC | Facility: CLINIC | Age: 63
End: 2023-09-27
Payer: COMMERCIAL

## 2023-09-27 DIAGNOSIS — Z23 NEED FOR INFLUENZA VACCINATION: Primary | ICD-10-CM

## 2023-09-27 PROCEDURE — 90686 IIV4 VACC NO PRSV 0.5 ML IM: CPT | Performed by: NURSE PRACTITIONER

## 2023-09-27 PROCEDURE — 90471 IMMUNIZATION ADMIN: CPT | Performed by: NURSE PRACTITIONER

## 2023-12-05 ENCOUNTER — TELEPHONE (OUTPATIENT)
Dept: GASTROENTEROLOGY | Facility: CLINIC | Age: 63
End: 2023-12-05
Payer: COMMERCIAL

## 2023-12-05 NOTE — TELEPHONE ENCOUNTER
Patient walked into the office about a missed call from the office. Looking at the chart we were trying to offer her a sooner appointment with Dr Preston. Patient stated she would just leave her appointment as scheduled.  She stated she was hoping to get an appointment back in August.

## 2023-12-05 NOTE — TELEPHONE ENCOUNTER
Attempted to contact the patient in regards to the appointment scheduled with Hollie Yin on 01/10/24, called the patient to advised that we have a new provider that is joining the pratice at the [facility] location and could see the patient sooner. I left a voicemail requesting a call back.

## 2023-12-05 NOTE — TELEPHONE ENCOUNTER
"  Caller: Celeste Bui \"Chyna\"    Relationship: Self    Best call back number: [phone]     What is the best time to reach you: ANYTIME    Who are you requesting to speak with (clinical staff, provider,  specific staff member): CLINICAL    Do you know the name of the person who called: STEPHANIE    What was the call regarding: EARLIER APPT    Is it okay if the provider responds through MyChart: YES        "

## 2024-01-10 ENCOUNTER — OFFICE VISIT (OUTPATIENT)
Dept: GASTROENTEROLOGY | Facility: CLINIC | Age: 64
End: 2024-01-10
Payer: COMMERCIAL

## 2024-01-10 VITALS
HEIGHT: 62 IN | SYSTOLIC BLOOD PRESSURE: 124 MMHG | WEIGHT: 141.8 LBS | DIASTOLIC BLOOD PRESSURE: 61 MMHG | HEART RATE: 77 BPM | BODY MASS INDEX: 26.09 KG/M2

## 2024-01-10 DIAGNOSIS — Z83.79 FH: CELIAC DISEASE: ICD-10-CM

## 2024-01-10 DIAGNOSIS — R15.0 INCOMPLETE DEFECATION: ICD-10-CM

## 2024-01-10 DIAGNOSIS — R19.4 CHANGE IN BOWEL HABITS: Primary | ICD-10-CM

## 2024-01-10 PROCEDURE — 99204 OFFICE O/P NEW MOD 45 MIN: CPT | Performed by: NURSE PRACTITIONER

## 2024-01-10 RX ORDER — SODIUM, POTASSIUM,MAG SULFATES 17.5-3.13G
2 SOLUTION, RECONSTITUTED, ORAL ORAL ONCE
Qty: 354 ML | Refills: 0 | Status: SHIPPED | OUTPATIENT
Start: 2024-01-10 | End: 2024-01-10

## 2024-01-10 NOTE — PROGRESS NOTES
Chief Complaint        Constipation    History of Present Illness      Celeste Bui is a 63 y.o. female who presents to [facility] as a new patient for constipation.    She admits since March of last year she has been having issues with constipation and incomplete emptying. She has increased her fluids and fiber. She has also tried going gluten free. She has even tried stool softeners and fiber supplements. She is using miralax 2 teaspoons a day and this has been better for her. She admits too much miralax gives her too much cramping. She didn't feel like gluten free helped. She is mildly allergic to wheat. She is on synthroid for hypothyroidism.  Denies any rectal bleeding or melena. Good appetite.     Rarely having issues with swallowing. Denies any reflux.     EGD/colonoscopy---Last colonoscopy was 13 years ago and was normal per patient report. She had done cologard NEGATIVE 3 years ago.Occult blood test x 1 negative.    GI family history---Brother with celiac disease. Father with appendicial cancer.     Results       Result Review :   The following data was reviewed by: LORI Koo on 01/10/2024     CMP          7/14/2023    06:46   CMP   Glucose 98    BUN 10    Creatinine 0.93    EGFR 69.2    Sodium 140    Potassium 4.4    Chloride 106    Calcium 9.7    Total Protein 7.0    Albumin 3.9    Globulin 3.1    Total Bilirubin 0.7    Alkaline Phosphatase 76    AST (SGOT) 12    ALT (SGPT) 12    Albumin/Globulin Ratio 1.3    BUN/Creatinine Ratio 10.8    Anion Gap 6.6      CBC          7/14/2023    06:46   CBC   WBC 5.06    RBC 4.82    Hemoglobin 13.9    Hematocrit 41.0    MCV 85.1    MCH 28.8    MCHC 33.9    RDW 13.2    Platelets 259      Lipid Panel          7/14/2023    06:46   Lipid Panel   Total Cholesterol 267    Triglycerides 107    HDL Cholesterol 45    VLDL Cholesterol 19    LDL Cholesterol  203      TSH          7/14/2023    06:46   TSH   TSH 0.282   "           Past Medical History       Past Medical History:   Diagnosis Date    Allergies     Hyperlipidemia     Hypothyroidism     Impingement syndrome of shoulder, right 2017    Migraines     Patellofemoral syndrome of right knee 2017    Thyroid disorder        Past Surgical History:   Procedure Laterality Date    BREAST BIOPSY  2018    CARPAL TUNNEL RELEASE Bilateral 2022    Left 22     SECTION      COLONOSCOPY      HYSTERECTOMY  2010    PROLASE    OTHER SURGICAL HISTORY  2016    URETHRAL SLING REPAIR W/ COMBINED ANTEROPOSTERIOR COLPORRHAPHY AND SACROSPINOUS FIXATION    TUBAL ABDOMINAL LIGATION           Current Outpatient Medications:     Synthroid 125 MCG tablet, Take 0.5 tablets by mouth Daily. For 6 day and a full tablet on 7th day., Disp: 62 tablet, Rfl: 1    sodium-potassium-magnesium sulfates (SUPREP) 17.5-3.13-1.6 GM/177ML solution oral solution, Take 2 bottles by mouth 1 (One) Time for 1 dose. Take as directed, Disp: 354 mL, Rfl: 0     Allergies   Allergen Reactions    Sulfa Antibiotics Anaphylaxis       Family History   Problem Relation Age of Onset    Diabetes Father     Lung cancer Other         FAMILY HISTORY OF    Throat cancer Other         FAMILY HISTORY OF    Celiac disease Brother     Ulcerative colitis Brother         Social History     Social History Narrative    CLAUSTOPHOBIC    HOMEMAKER    LIVES W/ SPOUSE           Objective       Objective     Vital Signs:   /61 (BP Location: Right arm, Patient Position: Sitting, Cuff Size: Adult)   Pulse 77   Ht 157.5 cm (62\")   Wt 64.3 kg (141 lb 12.8 oz)   BMI 25.94 kg/m²     Body mass index is 25.94 kg/m².    Review of Systems   Constitutional:  Negative for appetite change, chills, diaphoresis, fatigue, fever and unexpected weight change.   HENT:  Negative for nosebleeds, postnasal drip, sore throat, trouble swallowing and voice change.    Respiratory:  Negative for cough, choking, chest tightness, " shortness of breath, wheezing and stridor.    Cardiovascular:  Negative for chest pain, palpitations and leg swelling.   Gastrointestinal:  Positive for constipation. Negative for abdominal distention, abdominal pain, anal bleeding, blood in stool, diarrhea, nausea, rectal pain and vomiting.   Endocrine: Negative for polydipsia, polyphagia and polyuria.   Musculoskeletal:  Negative for gait problem.   Skin:  Negative for rash and wound.   Allergic/Immunologic: Negative for food allergies.   Neurological:  Negative for dizziness, speech difficulty and light-headedness.   Psychiatric/Behavioral:  Negative for confusion, self-injury, sleep disturbance and suicidal ideas.         Physical Exam  Constitutional:       General: She is not in acute distress.     Appearance: She is well-developed. She is not ill-appearing.   HENT:      Head: Normocephalic.   Eyes:      Pupils: Pupils are equal, round, and reactive to light.   Cardiovascular:      Rate and Rhythm: Normal rate and regular rhythm.      Heart sounds: Normal heart sounds.   Pulmonary:      Effort: Pulmonary effort is normal.      Breath sounds: Normal breath sounds.   Abdominal:      General: Bowel sounds are normal. There is no distension.      Palpations: Abdomen is soft. There is no mass.      Tenderness: There is no abdominal tenderness. There is no guarding or rebound.      Hernia: No hernia is present.   Musculoskeletal:         General: Normal range of motion.   Skin:     General: Skin is warm and dry.   Neurological:      Mental Status: She is alert and oriented to person, place, and time.   Psychiatric:         Speech: Speech normal.         Behavior: Behavior normal.         Judgment: Judgment normal.              Assessment & Plan          Assessment and Plan    Diagnoses and all orders for this visit:    1. Change in bowel habits (Primary)  -     Case Request; Standing  -     Follow Anesthesia Guidelines / Protocol; Future  -     Obtain Informed  Consent; Future  -     sodium-potassium-magnesium sulfates (SUPREP) 17.5-3.13-1.6 GM/177ML solution oral solution; Take 2 bottles by mouth 1 (One) Time for 1 dose. Take as directed  Dispense: 354 mL; Refill: 0  -     Case Request    2. Incomplete defecation  -     Case Request; Standing  -     Follow Anesthesia Guidelines / Protocol; Future  -     Obtain Informed Consent; Future  -     sodium-potassium-magnesium sulfates (SUPREP) 17.5-3.13-1.6 GM/177ML solution oral solution; Take 2 bottles by mouth 1 (One) Time for 1 dose. Take as directed  Dispense: 354 mL; Refill: 0  -     Case Request    3. FH: celiac disease  -     Case Request; Standing  -     Follow Anesthesia Guidelines / Protocol; Future  -     Obtain Informed Consent; Future  -     sodium-potassium-magnesium sulfates (SUPREP) 17.5-3.13-1.6 GM/177ML solution oral solution; Take 2 bottles by mouth 1 (One) Time for 1 dose. Take as directed  Dispense: 354 mL; Refill: 0  -     Case Request    Other orders  -     Follow Anesthesia Guidelines / Protocol; Standing  -     Verify NPO; Standing  -     Verify Bowel Prep Was Successful; Standing  -     Give Tap Water Enema If Bowel Prep Insufficient; Standing    Reviewed medical history with her today.  Given her history and current symptoms recommend colonoscopy with Dr. Ly for further evaluation.  Patient is agreeable to the scope.  Continue MiraLAX for now.  Continue high-fiber diet.  Patient to call the office with any issues.  Patient to follow-up with me after her scope.  Patient is agreeable to the plan.    Surgical Risk and Benefits discussed: Possible risks/complications, benefits, and alternatives to surgical or invasive procedure have been explained to patient and/or legal guardian; risks include bleeding, infection, and perforation. Patient has been evaluated and can tolerate anesthesia and/or sedation. Risks, benefits, and alternatives to anesthesia and sedation have been explained to patient and/or  legal guardian.            Follow Up       Follow Up   Return for F/U AFTER PROCEDURE.  Patient was given instructions and counseling regarding her condition or for health maintenance advice. Please see specific information pulled into the AVS if appropriate.

## 2024-01-17 ENCOUNTER — PATIENT ROUNDING (BHMG ONLY) (OUTPATIENT)
Dept: GASTROENTEROLOGY | Facility: CLINIC | Age: 64
End: 2024-01-17
Payer: COMMERCIAL

## 2024-01-17 NOTE — PROGRESS NOTES
"1/17/2024      Hello, may I speak with Celeste Bui     My name is Meryl. I am calling from [facility]. I show that you had a recent visit with LORI Plaza.    Before we get started may I verify your date of birth? 1960    I am calling to officially welcome you to our practice and ask about your recent visit. Is this a good time to talk?  Yes     Tell me about your visit with us. What things went well? \"Everything went well, she was very pleasant, easy to talk to, was on time and we discussed a plan & scheduled my procedure\"    We strive to ensure that we protect your safety and privacy. Is there anything we could have done to improve this during your visit?    No     We're always looking for ways to make our patients' experiences even better. Do you have recommendations on ways we may improve? No    Overall were you satisfied with your first visit to our practice?  Yes     I appreciate you taking the time to speak with me today. Is there anything else I can do for you?  No     I am glad to hear that you had a very good visit and I appreciate you taking the time to provide feedback on this call. We would greatly appreciate you filling out a survey if you receive one in the mail, email or text. This is a great opportunity to provide any additional feedback that you may think of after this call as well.       Thank you, and have a great day.    "

## 2024-02-06 ENCOUNTER — HOSPITAL ENCOUNTER (OUTPATIENT)
Dept: MAMMOGRAPHY | Facility: HOSPITAL | Age: 64
Discharge: HOME | End: 2024-02-06
Payer: COMMERCIAL

## 2024-02-06 ENCOUNTER — HOSPITAL ENCOUNTER (OUTPATIENT)
Dept: BONE DENSITY | Facility: HOSPITAL | Age: 64
Discharge: HOME | End: 2024-02-06
Payer: COMMERCIAL

## 2024-02-06 DIAGNOSIS — Z78.0 POSTMENOPAUSAL: ICD-10-CM

## 2024-02-06 DIAGNOSIS — Z12.31 ENCOUNTER FOR SCREENING MAMMOGRAM FOR MALIGNANT NEOPLASM OF BREAST: ICD-10-CM

## 2024-02-06 PROCEDURE — 77063 BREAST TOMOSYNTHESIS BI: CPT

## 2024-02-06 PROCEDURE — 77067 SCR MAMMO BI INCL CAD: CPT

## 2024-02-06 PROCEDURE — 77080 DXA BONE DENSITY AXIAL: CPT

## 2024-02-13 NOTE — PRE-PROCEDURE INSTRUCTIONS
"Instructed on date and arrival time of 0600. Come to entrance \"C\". Must have  over age 18 to drive home.  May have two visitors; however, children under 12 must stay in waiting room.  Discussed clear liquid diet (no red or purple), bowel prep, and NPO.  May take medications as usual except for blood thinners, diabetic medications, and weight loss medications.  Bring list of medications.  Verbalized understanding of instructions given.  Instructed to call for questions or concerns.  "
None

## 2024-02-16 ENCOUNTER — ANESTHESIA EVENT (OUTPATIENT)
Dept: GASTROENTEROLOGY | Facility: HOSPITAL | Age: 64
End: 2024-02-16
Payer: COMMERCIAL

## 2024-02-16 NOTE — ANESTHESIA PREPROCEDURE EVALUATION
Anesthesia Evaluation     Patient summary reviewed and Nursing notes reviewed   NPO Solid Status: > 8 hours  NPO Liquid Status: > 4 hours           Airway   Mallampati: I  TM distance: >3 FB  Neck ROM: full  No difficulty expected  Dental - normal exam     Pulmonary - negative pulmonary ROS and normal exam    breath sounds clear to auscultation  Cardiovascular - normal exam  Exercise tolerance: good (4-7 METS)    Rhythm: regular  Rate: normal    (+) hyperlipidemia      Neuro/Psych  (+) headaches  GI/Hepatic/Renal/Endo    (+) thyroid problem hypothyroidism    Musculoskeletal     (+) arthralgias  Abdominal    Substance History - negative use     OB/GYN negative ob/gyn ROS         Other   arthritis,     ROS/Med Hx Other: Patellofemoral syndrome of right knee                    Anesthesia Plan    ASA 2     general   total IV anesthesia  (Total IV Anesthesia    Patient understands anesthesia not responsible for dental damage.  )  intravenous induction     Anesthetic plan, risks, benefits, and alternatives have been provided, discussed and informed consent has been obtained with: patient and spouse/significant other.  Pre-procedure education provided  Plan discussed with CRNA.      CODE STATUS:

## 2024-02-19 ENCOUNTER — ANESTHESIA (OUTPATIENT)
Dept: GASTROENTEROLOGY | Facility: HOSPITAL | Age: 64
End: 2024-02-19
Payer: COMMERCIAL

## 2024-02-19 ENCOUNTER — HOSPITAL ENCOUNTER (OUTPATIENT)
Facility: HOSPITAL | Age: 64
Setting detail: HOSPITAL OUTPATIENT SURGERY
Discharge: HOME | End: 2024-02-19
Attending: INTERNAL MEDICINE | Admitting: INTERNAL MEDICINE
Payer: COMMERCIAL

## 2024-02-19 VITALS
RESPIRATION RATE: 18 BRPM | SYSTOLIC BLOOD PRESSURE: 98 MMHG | HEART RATE: 66 BPM | OXYGEN SATURATION: 97 % | DIASTOLIC BLOOD PRESSURE: 77 MMHG | TEMPERATURE: 98 F | BODY MASS INDEX: 25.16 KG/M2 | WEIGHT: 137.57 LBS

## 2024-02-19 DIAGNOSIS — Z83.79 FH: CELIAC DISEASE: ICD-10-CM

## 2024-02-19 DIAGNOSIS — R19.4 CHANGE IN BOWEL HABITS: ICD-10-CM

## 2024-02-19 DIAGNOSIS — R15.0 INCOMPLETE DEFECATION: ICD-10-CM

## 2024-02-19 PROCEDURE — 25010000002 PROPOFOL 10 MG/ML EMULSION: Performed by: NURSE ANESTHETIST, CERTIFIED REGISTERED

## 2024-02-19 PROCEDURE — 25810000003 LACTATED RINGERS PER 1000 ML: Performed by: NURSE ANESTHETIST, CERTIFIED REGISTERED

## 2024-02-19 PROCEDURE — 45385 COLONOSCOPY W/LESION REMOVAL: CPT | Performed by: INTERNAL MEDICINE

## 2024-02-19 PROCEDURE — 88305 TISSUE EXAM BY PATHOLOGIST: CPT | Performed by: INTERNAL MEDICINE

## 2024-02-19 RX ORDER — SODIUM CHLORIDE, SODIUM LACTATE, POTASSIUM CHLORIDE, CALCIUM CHLORIDE 600; 310; 30; 20 MG/100ML; MG/100ML; MG/100ML; MG/100ML
INJECTION, SOLUTION INTRAVENOUS CONTINUOUS PRN
Status: DISCONTINUED | OUTPATIENT
Start: 2024-02-19 | End: 2024-02-19 | Stop reason: SURG

## 2024-02-19 RX ORDER — LIDOCAINE HYDROCHLORIDE 20 MG/ML
INJECTION, SOLUTION EPIDURAL; INFILTRATION; INTRACAUDAL; PERINEURAL AS NEEDED
Status: DISCONTINUED | OUTPATIENT
Start: 2024-02-19 | End: 2024-02-19 | Stop reason: SURG

## 2024-02-19 RX ADMIN — PROPOFOL 250 MCG/KG/MIN: 10 INJECTION, EMULSION INTRAVENOUS at 07:21

## 2024-02-19 RX ADMIN — SODIUM CHLORIDE, POTASSIUM CHLORIDE, SODIUM LACTATE AND CALCIUM CHLORIDE: 600; 310; 30; 20 INJECTION, SOLUTION INTRAVENOUS at 07:19

## 2024-02-19 RX ADMIN — LIDOCAINE HYDROCHLORIDE 40 MG: 20 INJECTION, SOLUTION EPIDURAL; INFILTRATION; INTRACAUDAL; PERINEURAL at 07:21

## 2024-02-19 NOTE — H&P
Pre Procedure History & Physical    Chief Complaint:   Change in bowel habits, constipation    Subjective     HPI:   65 yo F here for change in bowel habits, constipation.    Past Medical History:   Past Medical History:   Diagnosis Date    Allergies     Hyperlipidemia     Hypothyroidism     Impingement syndrome of shoulder, right 2017    Migraines     Patellofemoral syndrome of right knee 2017    Thyroid disorder        Past Surgical History:  Past Surgical History:   Procedure Laterality Date    BREAST BIOPSY  2018    CARPAL TUNNEL RELEASE Bilateral 2022    Left 22     SECTION      COLONOSCOPY      HYSTERECTOMY  2010    PROLASE    OTHER SURGICAL HISTORY  2016    URETHRAL SLING REPAIR W/ COMBINED ANTEROPOSTERIOR COLPORRHAPHY AND SACROSPINOUS FIXATION    TUBAL ABDOMINAL LIGATION         Family History:  Family History   Problem Relation Age of Onset    Diabetes Father     Lung cancer Other         FAMILY HISTORY OF    Throat cancer Other         FAMILY HISTORY OF    Celiac disease Brother     Ulcerative colitis Brother        Social History:   reports that she has never smoked. She has never used smokeless tobacco. She reports that she does not drink alcohol and does not use drugs.    Medications:   Medications Prior to Admission   Medication Sig Dispense Refill Last Dose    Synthroid 125 MCG tablet Take 0.5 tablets by mouth Daily. For 6 day and a full tablet on 7th day. 62 tablet 1 Past Week       Allergies:  Sulfa antibiotics    ROS:    Pertinent items are noted in HPI     Objective     Blood pressure 129/79, pulse 66, temperature 97.7 °F (36.5 °C), temperature source Temporal, resp. rate 16, weight 62.4 kg (137 lb 9.1 oz), SpO2 97%.    Physical Exam   Constitutional: Pt is oriented to person, place, and time and well-developed, well-nourished, and in no distress.   Mouth/Throat: Oropharynx is clear and moist.   Neck: Normal range of motion.   Cardiovascular: Normal rate, regular  rhythm and normal heart sounds.    Pulmonary/Chest: Effort normal and breath sounds normal.   Abdominal: Soft. Nontender  Skin: Skin is warm and dry.   Psychiatric: Mood, memory, affect and judgment normal.     Assessment & Plan     Diagnosis:  Change in bowel habits, constipation    Anticipated Surgical Procedure:  Colonoscopy    The risks, benefits, and alternatives of this procedure have been discussed with the patient or the responsible party- the patient understands and agrees to proceed.

## 2024-02-19 NOTE — ANESTHESIA POSTPROCEDURE EVALUATION
Patient: Celeste Bui    Procedure Summary       Date: 02/19/24 Room / Location: [facility] 1 / [facility]    Anesthesia Start: 0719 Anesthesia Stop: 0749    Procedure: COLONOSCOPY WITH HOT & COLD SNARE POLYPECTOMY Diagnosis:       Change in bowel habits      FH: celiac disease      Incomplete defecation      (Change in bowel habits [R19.4])      (FH: celiac disease [Z83.79])      (Incomplete defecation [R15.0])    Surgeons: Padmini Ly MD Provider: Shara Yadav CRNA    Anesthesia Type: general ASA Status: 2            Anesthesia Type: general    Vitals  Vitals Value Taken Time   BP 98/77 02/19/24 0808   Temp 36.7 °C (98 °F) 02/19/24 0747   Pulse 62 02/19/24 0809   Resp 18 02/19/24 0808   SpO2 97 % 02/19/24 0809   Vitals shown include unfiled device data.        Post Anesthesia Care and Evaluation    Post-procedure mental status: acceptable.  Pain management: satisfactory to patient    Airway patency: patent  Anesthetic complications: No anesthetic complications    Cardiovascular status: acceptable  Respiratory status: acceptable    Comments: Per chart review

## 2024-02-20 LAB
CYTO UR: NORMAL
LAB AP CASE REPORT: NORMAL
LAB AP CLINICAL INFORMATION: NORMAL
PATH REPORT.FINAL DX SPEC: NORMAL
PATH REPORT.GROSS SPEC: NORMAL

## 2024-02-21 ENCOUNTER — HOSPITAL ENCOUNTER (EMERGENCY)
Facility: HOSPITAL | Age: 64
Discharge: HOME | End: 2024-02-21
Attending: EMERGENCY MEDICINE
Payer: COMMERCIAL

## 2024-02-21 ENCOUNTER — APPOINTMENT (OUTPATIENT)
Dept: CT IMAGING | Facility: HOSPITAL | Age: 64
End: 2024-02-21
Payer: COMMERCIAL

## 2024-02-21 VITALS
HEIGHT: 62 IN | SYSTOLIC BLOOD PRESSURE: 124 MMHG | WEIGHT: 140.21 LBS | TEMPERATURE: 98.5 F | OXYGEN SATURATION: 95 % | RESPIRATION RATE: 17 BRPM | HEART RATE: 75 BPM | DIASTOLIC BLOOD PRESSURE: 68 MMHG | BODY MASS INDEX: 25.8 KG/M2

## 2024-02-21 DIAGNOSIS — K57.90 DIVERTICULOSIS: ICD-10-CM

## 2024-02-21 DIAGNOSIS — N28.1 RENAL CYST, LEFT: ICD-10-CM

## 2024-02-21 DIAGNOSIS — K76.89 LIVER CYST: ICD-10-CM

## 2024-02-21 DIAGNOSIS — R10.31 RIGHT LOWER QUADRANT ABDOMINAL PAIN: Primary | ICD-10-CM

## 2024-02-21 LAB
ALBUMIN SERPL-MCNC: 4.1 G/DL (ref 3.5–5.2)
ALBUMIN/GLOB SERPL: 1.2 G/DL
ALP SERPL-CCNC: 89 U/L (ref 39–117)
ALT SERPL W P-5'-P-CCNC: 13 U/L (ref 1–33)
ANION GAP SERPL CALCULATED.3IONS-SCNC: 10.3 MMOL/L (ref 5–15)
AST SERPL-CCNC: 20 U/L (ref 1–32)
BACTERIA UR QL AUTO: ABNORMAL /HPF
BASOPHILS # BLD AUTO: 0.03 10*3/MM3 (ref 0–0.2)
BASOPHILS NFR BLD AUTO: 0.5 % (ref 0–1.5)
BILIRUB SERPL-MCNC: 0.7 MG/DL (ref 0–1.2)
BILIRUB UR QL STRIP: NEGATIVE
BUN SERPL-MCNC: 11 MG/DL (ref 8–23)
BUN/CREAT SERPL: 12.4 (ref 7–25)
CALCIUM SPEC-SCNC: 9.8 MG/DL (ref 8.6–10.5)
CHLORIDE SERPL-SCNC: 103 MMOL/L (ref 98–107)
CLARITY UR: CLEAR
CO2 SERPL-SCNC: 26.7 MMOL/L (ref 22–29)
COLOR UR: YELLOW
CREAT SERPL-MCNC: 0.89 MG/DL (ref 0.57–1)
D-LACTATE SERPL-SCNC: 0.8 MMOL/L (ref 0.5–2)
DEPRECATED RDW RBC AUTO: 41 FL (ref 37–54)
EGFRCR SERPLBLD CKD-EPI 2021: 72.5 ML/MIN/1.73
EOSINOPHIL # BLD AUTO: 0.2 10*3/MM3 (ref 0–0.4)
EOSINOPHIL NFR BLD AUTO: 3.2 % (ref 0.3–6.2)
ERYTHROCYTE [DISTWIDTH] IN BLOOD BY AUTOMATED COUNT: 13.1 % (ref 12.3–15.4)
GLOBULIN UR ELPH-MCNC: 3.4 GM/DL
GLUCOSE SERPL-MCNC: 98 MG/DL (ref 65–99)
GLUCOSE UR STRIP-MCNC: NEGATIVE MG/DL
HCT VFR BLD AUTO: 43.6 % (ref 34–46.6)
HGB BLD-MCNC: 14.3 G/DL (ref 12–15.9)
HGB UR QL STRIP.AUTO: ABNORMAL
HOLD SPECIMEN: NORMAL
HOLD SPECIMEN: NORMAL
HYALINE CASTS UR QL AUTO: ABNORMAL /LPF
IMM GRANULOCYTES # BLD AUTO: 0.02 10*3/MM3 (ref 0–0.05)
IMM GRANULOCYTES NFR BLD AUTO: 0.3 % (ref 0–0.5)
KETONES UR QL STRIP: NEGATIVE
LEUKOCYTE ESTERASE UR QL STRIP.AUTO: NEGATIVE
LIPASE SERPL-CCNC: 28 U/L (ref 13–60)
LYMPHOCYTES # BLD AUTO: 1.42 10*3/MM3 (ref 0.7–3.1)
LYMPHOCYTES NFR BLD AUTO: 22.4 % (ref 19.6–45.3)
MCH RBC QN AUTO: 27.9 PG (ref 26.6–33)
MCHC RBC AUTO-ENTMCNC: 32.8 G/DL (ref 31.5–35.7)
MCV RBC AUTO: 85.2 FL (ref 79–97)
MONOCYTES # BLD AUTO: 0.34 10*3/MM3 (ref 0.1–0.9)
MONOCYTES NFR BLD AUTO: 5.4 % (ref 5–12)
NEUTROPHILS NFR BLD AUTO: 4.32 10*3/MM3 (ref 1.7–7)
NEUTROPHILS NFR BLD AUTO: 68.2 % (ref 42.7–76)
NITRITE UR QL STRIP: NEGATIVE
NRBC BLD AUTO-RTO: 0 /100 WBC (ref 0–0.2)
PH UR STRIP.AUTO: 6.5 [PH] (ref 5–8)
PLATELET # BLD AUTO: 264 10*3/MM3 (ref 140–450)
PMV BLD AUTO: 9.1 FL (ref 6–12)
POTASSIUM SERPL-SCNC: 3.9 MMOL/L (ref 3.5–5.2)
PROT SERPL-MCNC: 7.5 G/DL (ref 6–8.5)
PROT UR QL STRIP: NEGATIVE
RBC # BLD AUTO: 5.12 10*6/MM3 (ref 3.77–5.28)
RBC # UR STRIP: ABNORMAL /HPF
REF LAB TEST METHOD: ABNORMAL
SODIUM SERPL-SCNC: 140 MMOL/L (ref 136–145)
SP GR UR STRIP: 1.01 (ref 1–1.03)
SQUAMOUS #/AREA URNS HPF: ABNORMAL /HPF
UROBILINOGEN UR QL STRIP: ABNORMAL
WBC # UR STRIP: ABNORMAL /HPF
WBC NRBC COR # BLD AUTO: 6.33 10*3/MM3 (ref 3.4–10.8)
WHOLE BLOOD HOLD COAG: NORMAL
WHOLE BLOOD HOLD SPECIMEN: NORMAL

## 2024-02-21 PROCEDURE — 85025 COMPLETE CBC W/AUTO DIFF WBC: CPT | Performed by: EMERGENCY MEDICINE

## 2024-02-21 PROCEDURE — 80053 COMPREHEN METABOLIC PANEL: CPT | Performed by: EMERGENCY MEDICINE

## 2024-02-21 PROCEDURE — 25510000001 IOPAMIDOL PER 1 ML: Performed by: EMERGENCY MEDICINE

## 2024-02-21 PROCEDURE — 74177 CT ABD & PELVIS W/CONTRAST: CPT

## 2024-02-21 PROCEDURE — 81001 URINALYSIS AUTO W/SCOPE: CPT | Performed by: EMERGENCY MEDICINE

## 2024-02-21 PROCEDURE — 83690 ASSAY OF LIPASE: CPT | Performed by: EMERGENCY MEDICINE

## 2024-02-21 PROCEDURE — 83605 ASSAY OF LACTIC ACID: CPT | Performed by: EMERGENCY MEDICINE

## 2024-02-21 PROCEDURE — 99285 EMERGENCY DEPT VISIT HI MDM: CPT

## 2024-02-21 RX ORDER — DICYCLOMINE HCL 20 MG
20 TABLET ORAL EVERY 6 HOURS
Qty: 20 TABLET | Refills: 0 | Status: SHIPPED | OUTPATIENT
Start: 2024-02-21

## 2024-02-21 RX ORDER — SODIUM CHLORIDE 0.9 % (FLUSH) 0.9 %
10 SYRINGE (ML) INJECTION AS NEEDED
Status: DISCONTINUED | OUTPATIENT
Start: 2024-02-21 | End: 2024-02-21 | Stop reason: HOSPADM

## 2024-02-21 RX ORDER — KETOROLAC TROMETHAMINE 30 MG/ML
30 INJECTION, SOLUTION INTRAMUSCULAR; INTRAVENOUS ONCE
Status: DISCONTINUED | OUTPATIENT
Start: 2024-02-21 | End: 2024-02-21 | Stop reason: HOSPADM

## 2024-02-21 RX ADMIN — IOPAMIDOL 100 ML: 755 INJECTION, SOLUTION INTRAVENOUS at 08:13

## 2024-02-21 NOTE — DISCHARGE INSTRUCTIONS
Continue follow-up with GI as you have scheduled.  Take Bentyl as needed for abdominal pain.  Return to the emergency department for fever, intractable vomiting, intractable abdominal pain, or other symptoms concerning to you.

## 2024-02-21 NOTE — ED PROVIDER NOTES
Time: 7:22 AM EST  Date of encounter:  2024  Independent Historian/Clinical History and Information was obtained by:   Patient and Family    History is limited by: N/A    Chief Complaint: Abdominal pain      History of Present Illness:  Patient is a 64 y.o. year old female who presents to the emergency department for evaluation of abdominal pain.  Patient states that she began having abdominal pain on Wednesday of last week but it resolved over the weekend.  Patient states that she had take bowel prep over the weekend for colonoscopy on Monday however the pain has returned since then.  Patient states pain is located in the right lower quadrant of her abdomen and does not radiate.  Patient denies nausea, vomiting, diarrhea, dysuria, chest pain, shortness of breath, fever.  Patient states that pain initially was mainly only bothering her at rest but now is painful when she is walking.  No other complaints.    HPI    Patient Care Team  Primary Care Provider: Stephen Bentley APRN    Past Medical History:     Allergies   Allergen Reactions    Sulfa Antibiotics Anaphylaxis     Past Medical History:   Diagnosis Date    Allergies     Hyperlipidemia     Hypothyroidism     Impingement syndrome of shoulder, right 2017    Migraines     Patellofemoral syndrome of right knee 2017    Thyroid disorder      Past Surgical History:   Procedure Laterality Date    BREAST BIOPSY  2018    CARPAL TUNNEL RELEASE Bilateral 2022    Left 22     SECTION      COLONOSCOPY      COLONOSCOPY N/A 2024    Procedure: COLONOSCOPY WITH HOT & COLD SNARE POLYPECTOMY;  Surgeon: Padmini Ly MD;  Location: [facility] ENDOSCOPY;  Service: Gastroenterology;  Laterality: N/A;  COLON POLYPS, DIVERTICULOSIS, HEMORRHOIDS    HYSTERECTOMY  2010    PROLASE    OTHER SURGICAL HISTORY  2016    URETHRAL SLING REPAIR W/ COMBINED ANTEROPOSTERIOR COLPORRHAPHY AND SACROSPINOUS FIXATION    TUBAL ABDOMINAL LIGATION       Family  "History   Problem Relation Age of Onset    Diabetes Father     Lung cancer Other         FAMILY HISTORY OF    Throat cancer Other         FAMILY HISTORY OF    Celiac disease Brother     Ulcerative colitis Brother        Home Medications:  Prior to Admission medications    Medication Sig Start Date End Date Taking? Authorizing Provider   Synthroid 125 MCG tablet Take 0.5 tablets by mouth Daily. For 6 day and a full tablet on 7th day. 7/27/23   Stephen Bentley APRN        Social History:   Social History     Tobacco Use    Smoking status: Never    Smokeless tobacco: Never   Vaping Use    Vaping Use: Never used   Substance Use Topics    Alcohol use: Never    Drug use: Never         Review of Systems:  Review of Systems   Constitutional:  Negative for fever.   Respiratory:  Negative for shortness of breath.    Cardiovascular:  Negative for chest pain.   Gastrointestinal:  Positive for abdominal pain. Negative for diarrhea, nausea and vomiting.   Genitourinary:  Negative for dysuria.   All other systems reviewed and are negative.       Physical Exam:  /68   Pulse 75   Temp 98.5 °F (36.9 °C) (Oral)   Resp 17   Ht 157.5 cm (62\")   Wt 63.6 kg (140 lb 3.4 oz)   SpO2 95%   BMI 25.65 kg/m²     Physical Exam  Vitals and nursing note reviewed.   Constitutional:       General: She is not in acute distress.     Appearance: Normal appearance. She is well-developed. She is not ill-appearing, toxic-appearing or diaphoretic.   HENT:      Head: Normocephalic and atraumatic.      Nose: Nose normal.   Eyes:      Extraocular Movements: Extraocular movements intact.      Conjunctiva/sclera: Conjunctivae normal.      Pupils: Pupils are equal, round, and reactive to light.   Cardiovascular:      Rate and Rhythm: Normal rate and regular rhythm.      Heart sounds: Normal heart sounds.   Pulmonary:      Effort: Pulmonary effort is normal.      Breath sounds: Normal breath sounds.   Abdominal:      General: Abdomen is flat. Bowel " sounds are normal. There is no distension. There are no signs of injury.      Palpations: Abdomen is soft. There is no hepatomegaly, splenomegaly, mass or pulsatile mass.      Tenderness: There is abdominal tenderness in the right lower quadrant. There is no guarding or rebound. Negative signs include Magaña's sign, Rovsing's sign and McBurney's sign.   Musculoskeletal:         General: Normal range of motion.      Cervical back: Normal range of motion and neck supple.   Skin:     General: Skin is warm and dry.   Neurological:      General: No focal deficit present.      Mental Status: She is alert and oriented to person, place, and time.   Psychiatric:         Mood and Affect: Mood normal.         Behavior: Behavior normal.         Thought Content: Thought content normal.         Judgment: Judgment normal.                Procedures:  Procedures      Medical Decision Making:    Comorbidities that affect care:    Hyperlipidemia, thyroid disorder    External Notes reviewed:    Previous Operation Note: Colonoscopy completed on 2-      The following orders were placed and all results were independently analyzed by me:  Orders Placed This Encounter   Procedures    CT Abdomen Pelvis With Contrast    [place] Draw    Comprehensive Metabolic Panel    Lipase    Urinalysis With Microscopic If Indicated (No Culture) - Urine, Clean Catch    Lactic Acid, Plasma    CBC Auto Differential    Urinalysis, Microscopic Only - Urine, Clean Catch    NPO Diet NPO Type: Strict NPO    Undress & Gown    Insert Peripheral IV    CBC & Differential    Green Top (Gel)    Lavender Top    Gold Top - SST    Light Blue Top       Medications Given in the Emergency Department:  Medications   sodium chloride 0.9 % flush 10 mL (has no administration in time range)   ketorolac (TORADOL) injection 30 mg (0 mg Intravenous Hold 2/21/24 3873)   iopamidol (ISOVUE-370) 76 % injection 100 mL (100 mL Intravenous Given 2/21/24 0813)        ED Course:          Labs:    Lab Results (last 24 hours)       Procedure Component Value Units Date/Time    CBC & Differential [[id number]]  (Normal) Collected: 02/21/24 0701    Specimen: Blood Updated: 02/21/24 0710    Narrative:      The following orders were created for panel order CBC & Differential.  Procedure                               Abnormality         Status                     ---------                               -----------         ------                     CBC Auto Differential[[id number]]        Normal              Final result                 Please view results for these tests on the individual orders.    Comprehensive Metabolic Panel [[id number]] Collected: 02/21/24 0701    Specimen: Blood Updated: 02/21/24 0733     Glucose 98 mg/dL      BUN 11 mg/dL      Creatinine 0.89 mg/dL      Sodium 140 mmol/L      Potassium 3.9 mmol/L      Chloride 103 mmol/L      CO2 26.7 mmol/L      Calcium 9.8 mg/dL      Total Protein 7.5 g/dL      Albumin 4.1 g/dL      ALT (SGPT) 13 U/L      AST (SGOT) 20 U/L      Alkaline Phosphatase 89 U/L      Total Bilirubin 0.7 mg/dL      Globulin 3.4 gm/dL      A/G Ratio 1.2 g/dL      BUN/Creatinine Ratio 12.4     Anion Gap 10.3 mmol/L      eGFR 72.5 mL/min/1.73     Narrative:      GFR Normal >60  Chronic Kidney Disease <60  Kidney Failure <15      Lipase [[id number]]  (Normal) Collected: 02/21/24 0701    Specimen: Blood Updated: 02/21/24 0733     Lipase 28 U/L     Lactic Acid, Plasma [[id number]]  (Normal) Collected: 02/21/24 0701    Specimen: Blood Updated: 02/21/24 0729     Lactate 0.8 mmol/L     CBC Auto Differential [[id number]]  (Normal) Collected: 02/21/24 0701    Specimen: Blood Updated: 02/21/24 0710     WBC 6.33 10*3/mm3      RBC 5.12 10*6/mm3      Hemoglobin 14.3 g/dL      Hematocrit 43.6 %      MCV 85.2 fL      MCH 27.9 pg      MCHC 32.8 g/dL      RDW 13.1 %      RDW-SD 41.0 fl      MPV 9.1 fL      Platelets 264 10*3/mm3      Neutrophil % 68.2 %      Lymphocyte % 22.4 %      Monocyte %  5.4 %      Eosinophil % 3.2 %      Basophil % 0.5 %      Immature Grans % 0.3 %      Neutrophils, Absolute 4.32 10*3/mm3      Lymphocytes, Absolute 1.42 10*3/mm3      Monocytes, Absolute 0.34 10*3/mm3      Eosinophils, Absolute 0.20 10*3/mm3      Basophils, Absolute 0.03 10*3/mm3      Immature Grans, Absolute 0.02 10*3/mm3      nRBC 0.0 /100 WBC     Urinalysis With Microscopic If Indicated (No Culture) - Urine, Clean Catch [[id number]]  (Abnormal) Collected: 02/21/24 0808    Specimen: Urine, Clean Catch Updated: 02/21/24 0830     Color, UA Yellow     Appearance, UA Clear     pH, UA 6.5     Specific Gravity, UA 1.013     Glucose, UA Negative     Ketones, UA Negative     Bilirubin, UA Negative     Blood, UA Trace     Protein, UA Negative     Leuk Esterase, UA Negative     Nitrite, UA Negative     Urobilinogen, UA 0.2 E.U./dL    Urinalysis, Microscopic Only - Urine, Clean Catch [[id number]]  (Abnormal) Collected: 02/21/24 0808    Specimen: Urine, Clean Catch Updated: 02/21/24 0830     RBC, UA 3-5 /HPF      WBC, UA 0-2 /HPF      Bacteria, UA None Seen /HPF      Squamous Epithelial Cells, UA 0-2 /HPF      Hyaline Casts, UA None Seen /LPF      Methodology Automated Microscopy             Imaging:    CT Abdomen Pelvis With Contrast    Result Date: 2/21/2024  PROCEDURE: CT ABDOMEN PELVIS W CONTRAST  COMPARISON: None  INDICATIONS: Right lower quadrant abdominal pain  TECHNIQUE: After obtaining the patient's consent, CT images were created with non-ionic intravenous contrast material.   PROTOCOL:   Standard imaging protocol performed    RADIATION:   DLP: 524.8mGy*cm   Automated exposure control was utilized to minimize radiation dose. CONTRAST: 100cc Isovue 370 I.V.  FINDINGS:  The lung bases are clear bilaterally.  A 0.5 cm hypodense focus in the right hepatic lobe is noted, too small to further characterize.  In the right hepatic lobe on image 61 is a vague hypodense focus measuring 0.6 cm.  The gallbladder, spleen, pancreas  and adrenal glands appear unremarkable.  In the superior left kidney is a 0.9 cm hypodense focus favored to represent a small cyst.  The kidneys otherwise appear unremarkable.  No adenopathy or free fluid is seen in the abdomen or pelvis.  The urinary bladder appears unremarkable.  No ureteral stone is identified.  No adenopathy or free fluid is seen in the abdomen or pelvis.  A hysterectomy has been performed.  The ovaries appear unremarkable.  Mild colonic diverticulosis is noted.  No acute bowel abnormality is identified.  The appendix appears normal.  No hernia is seen.   No focal osseous abnormality is seen.         1. Sub cm hypodense foci in the liver, statistically most likely cysts but too small for definitive characterization with CT 2. Probable 0.9 cm left renal cyst 3. Previous hysterectomy 4. Mild colonic diverticulosis     Ed Guadarrama M.D.       Electronically Signed and Approved By: Ed Guadarrama M.D. on 2/21/2024 at 8:51                Differential Diagnosis and Discussion:    Abdominal Pain: Based on the patient's signs and symptoms, I considered abdominal aortic aneurysm, small bowel obstruction, pancreatitis, acute cholecystitis, acute appendecitis, peptic ulcer disease, gastritis, colitis, endocrine disorders, irritable bowel syndrome and other differential diagnosis an etiology of the patient's abdominal pain.    All labs were reviewed and interpreted by me.  CT scan radiology impression was interpreted by me.    MDM  Number of Diagnoses or Management Options  Diagnosis management comments: Cough patient presented to the emergency department today for evaluation of abdominal pain.  CBC, CMP, lipase unremarkable.  Urinalysis did have trace microscopic hematuria.  CT abdomen pelvis had no acute findings but did note chronic findings of left renal cyst, liver cyst, and diverticulosis.  Patient does have follow-up with gastroenterology next week which I will have her continue.  I will prescribe  patient Bentyl as needed for abdominal pain.       Amount and/or Complexity of Data Reviewed  Clinical lab tests: reviewed and ordered  Tests in the radiology section of CPT®: reviewed and ordered    Risk of Complications, Morbidity, and/or Mortality  Presenting problems: moderate  Diagnostic procedures: moderate  Management options: low    Patient Progress  Patient progress: stable       Patient Care Considerations:    CONSULT: I considered consulting gastroenterology, however there are no significant findings on patient CT and patient has normal hemoglobin adequate      Consultants/Shared Management Plan:    None    Social Determinants of Health:    Patient is independent, reliable, and has access to care.       Disposition and Care Coordination:    Discharged: The patient is suitable and stable for discharge with no need for consideration of admission.    I have explained the patient´s condition, diagnoses and treatment plan based on the information available to me at this time. I have answered questions and addressed any concerns. The patient has a good  understanding of the patient´s diagnosis, condition, and treatment plan as can be expected at this point. The vital signs have been stable. The patient´s condition is stable and appropriate for discharge from the emergency department.      The patient will pursue further outpatient evaluation with the primary care physician or other designated or consulting physician as outlined in the discharge instructions. They are agreeable to this plan of care and follow-up instructions have been explained in detail. The patient has received these instructions in written format and has expressed an understanding of the discharge instructions. The patient is aware that any significant change in condition or worsening of symptoms should prompt an immediate return to this or the closest emergency department or call to 911.  I have explained discharge medications and the need  for follow up with the patient/caretakers. This was also printed in the discharge instructions. Patient was discharged with the following medications and follow up:      Medication List        New Prescriptions      dicyclomine 20 MG tablet  Commonly known as: BENTYL  Take 1 tablet by mouth Every 6 (Six) Hours.               Where to Get Your Medications        These medications were sent to [facility] - [place], [place] - [address] [place] [address] - [phone]  - [phone] [address] [address]      Phone: [phone]   dicyclomine 20 MG tablet      Stephen Bentley, APRN  [address]  [address]  [facility] [address] [phone]             Final diagnoses:   Right lower quadrant abdominal pain   Diverticulosis   Renal cyst, left   Liver cyst        ED Disposition       ED Disposition   Discharge    Condition   Stable    Comment   --               This medical record created using voice recognition software.             Harley Swartz PA-C  02/21/24 0917

## 2024-02-26 ENCOUNTER — OFFICE VISIT (OUTPATIENT)
Dept: FAMILY MEDICINE CLINIC | Facility: CLINIC | Age: 64
End: 2024-02-26
Payer: COMMERCIAL

## 2024-02-26 VITALS
RESPIRATION RATE: 16 BRPM | WEIGHT: 140.4 LBS | DIASTOLIC BLOOD PRESSURE: 72 MMHG | OXYGEN SATURATION: 98 % | SYSTOLIC BLOOD PRESSURE: 130 MMHG | HEART RATE: 67 BPM | HEIGHT: 62 IN | TEMPERATURE: 96.8 F | BODY MASS INDEX: 25.83 KG/M2

## 2024-02-26 DIAGNOSIS — S76.211A INGUINAL STRAIN, RIGHT, INITIAL ENCOUNTER: Primary | ICD-10-CM

## 2024-02-26 DIAGNOSIS — R31.9 HEMATURIA, UNSPECIFIED TYPE: ICD-10-CM

## 2024-02-26 LAB
BILIRUB UR QL STRIP: NEGATIVE
CLARITY UR: CLEAR
COLOR UR: YELLOW
GLUCOSE UR STRIP-MCNC: NEGATIVE MG/DL
HGB UR QL STRIP.AUTO: NEGATIVE
HOLD SPECIMEN: NORMAL
KETONES UR QL STRIP: NEGATIVE
LEUKOCYTE ESTERASE UR QL STRIP.AUTO: NEGATIVE
NITRITE UR QL STRIP: NEGATIVE
PH UR STRIP.AUTO: 6.5 [PH] (ref 5–8)
PROT UR QL STRIP: NEGATIVE
SP GR UR STRIP: 1.01 (ref 1–1.03)
UROBILINOGEN UR QL STRIP: NORMAL

## 2024-02-26 PROCEDURE — 81003 URINALYSIS AUTO W/O SCOPE: CPT | Performed by: NURSE PRACTITIONER

## 2024-02-26 NOTE — PROGRESS NOTES
Chief Complaint  Abdominal Pain (Started on , had colonoscopy pain cam back Tuesday night wet to ED wed was gone by Sat and now is back/)    Subjective        Celeste Bui presents to [facility]  History of Present Illness  Abdominal pain started  - and had colonoscopy the following Monday and had 5 polyps removed.  Ended up in ED and was evaluated with blood and ct scan  Abdominal Pain  Pertinent negatives include no fever.       The following portions of the patient's history were personally reviewed and updated as appropriate: allergies, current medications, past medical history, past surgical history, past family history, and past social history.     Body mass index is 25.67 kg/m².           Past History:    Medical History: has a past medical history of Allergies, Hyperlipidemia, Hypothyroidism, Impingement syndrome of shoulder, right (2017), Migraines, Patellofemoral syndrome of right knee (2017), and Thyroid disorder.     Surgical History: has a past surgical history that includes Breast biopsy ();  section; Colonoscopy; Hysterectomy (); Other surgical history (); Carpal tunnel release (Bilateral, 2022); Tubal ligation; and Colonoscopy (N/A, 2024).     Family History: family history includes Celiac disease in her brother; Diabetes in her father; Lung cancer in an other family member; Throat cancer in an other family member; Ulcerative colitis in her brother.     Social History: reports that she has never smoked. She has never used smokeless tobacco. She reports that she does not drink alcohol and does not use drugs.    Allergies: Sulfa antibiotics          Current Outpatient Medications:     Synthroid 125 MCG tablet, Take 0.5 tablets by mouth Daily. For 6 day and a full tablet on 7th day., Disp: 62 tablet, Rfl: 1    diclofenac (VOLTAREN) 50 MG EC tablet, Take 1 tablet by mouth 2 (Two) Times a Day As Needed (pain).,  "Disp: 60 tablet, Rfl: 1    dicyclomine (BENTYL) 20 MG tablet, Take 1 tablet by mouth Every 6 (Six) Hours. (Patient not taking: Reported on 2/26/2024), Disp: 20 tablet, Rfl: 0    There are no discontinued medications.      Review of Systems   Constitutional:  Negative for fever.   Respiratory:  Negative for cough and shortness of breath.    Cardiovascular:  Negative for chest pain, palpitations and leg swelling.   Gastrointestinal:  Positive for abdominal pain.   Neurological:  Negative for dizziness, weakness, numbness and headache.        Objective         Vitals:    02/26/24 1014   BP: 130/72   BP Location: Right arm   Patient Position: Sitting   Cuff Size: Adult   Pulse: 67   Resp: 16   Temp: 96.8 °F (36 °C)   TempSrc: Temporal   SpO2: 98%   Weight: 63.7 kg (140 lb 6.4 oz)   Height: 157.5 cm (62.01\")     Body mass index is 25.67 kg/m².         Physical Exam  Vitals reviewed.   Constitutional:       Appearance: Normal appearance. She is well-developed.   HENT:      Head: Normocephalic and atraumatic.      Mouth/Throat:      Pharynx: No oropharyngeal exudate.   Eyes:      Conjunctiva/sclera: Conjunctivae normal.      Pupils: Pupils are equal, round, and reactive to light.   Cardiovascular:      Rate and Rhythm: Normal rate and regular rhythm.      Heart sounds: Normal heart sounds. No murmur heard.     No friction rub. No gallop.   Pulmonary:      Effort: Pulmonary effort is normal.      Breath sounds: Normal breath sounds. No wheezing or rhonchi.   Abdominal:          Comments: Tenderness to palpation.  Pain with movement.   Skin:     General: Skin is warm and dry.   Neurological:      Mental Status: She is alert and oriented to person, place, and time.   Psychiatric:         Mood and Affect: Mood and affect normal.         Behavior: Behavior normal.         Thought Content: Thought content normal.         Judgment: Judgment normal.             Result Review :               Assessment and Plan     Diagnoses and " all orders for this visit:    1. Inguinal strain, right, initial encounter (Primary)  -     diclofenac (VOLTAREN) 50 MG EC tablet; Take 1 tablet by mouth 2 (Two) Times a Day As Needed (pain).  Dispense: 60 tablet; Refill: 1    2. Hematuria, unspecified type  -     Urinalysis With Culture If Indicated -              Follow Up     Return for Next scheduled follow up.    Patient was given instructions and counseling regarding her condition or for health maintenance advice. Please see specific information pulled into the AVS if appropriate.

## 2024-03-27 DIAGNOSIS — Z00.00 ANNUAL PHYSICAL EXAM: Primary | ICD-10-CM

## 2024-06-03 ENCOUNTER — OFFICE VISIT (OUTPATIENT)
Dept: FAMILY MEDICINE CLINIC | Facility: CLINIC | Age: 64
End: 2024-06-03
Payer: COMMERCIAL

## 2024-06-03 VITALS
DIASTOLIC BLOOD PRESSURE: 80 MMHG | OXYGEN SATURATION: 97 % | TEMPERATURE: 97.6 F | SYSTOLIC BLOOD PRESSURE: 122 MMHG | HEART RATE: 59 BPM | BODY MASS INDEX: 25.71 KG/M2 | WEIGHT: 139.7 LBS | HEIGHT: 62 IN

## 2024-06-03 DIAGNOSIS — K14.6 TONGUE PAIN: Primary | ICD-10-CM

## 2024-06-03 DIAGNOSIS — K13.70 ORAL LESION: ICD-10-CM

## 2024-06-03 PROCEDURE — 99213 OFFICE O/P EST LOW 20 MIN: CPT

## 2024-06-03 NOTE — PROGRESS NOTES
"Celeste Bui presents to [facility] with complaints of concern for thrush, oral/tongue film and pain with intake.      History of Present Illness  This is a 64-year-old female who presents to clinic with complaints of concern for thrush, oral/tongue for and pain with intake.    Patient states her symptoms started a few days ago, states that she has not been sick recently, but of note she has been having a lot of dental/mouth issues since March.  Patient states that she went to go and have a crown placed on one of her teeth, and then states a lot of complications came from that.  They had cut a nerve, and she has had some chronic pain really ever since, they have gone in there several times and have assured her that there is not an abscess in her mouth.  Patient states that she is then noticed over the past few days that she has had this film go over her tongue, states that it started with just feeling like there was a film and then when she woke up this morning she noticed it actually being covered.  States that she has had some mild pain especially with eating foods that are really acidic and or with salty foods.  Denies really any other symptoms.  No sore throat.  No fever/chill/bodyaches, no other upper respiratory symptoms.  No recent illness or GI issues.    The following portions of the patient's history were personally reviewed and updated as appropriate: allergies, current medications, past medical history, past surgical history, past family history, and past social history.       Objective   Vital Signs:   /80 (BP Location: Left arm, Patient Position: Sitting, Cuff Size: Adult)   Pulse 59   Temp 97.6 °F (36.4 °C)   Ht 157.5 cm (62.01\")   Wt 63.4 kg (139 lb 11.2 oz)   SpO2 97%   BMI 25.54 kg/m²     Body mass index is 25.54 kg/m².    All labs, imaging, test results, and specialty provider notes reviewed with patient.     Physical Exam  Vitals reviewed. "   Constitutional:       Appearance: Normal appearance.   HENT:      Mouth/Throat:      Lips: Lesions present.      Mouth: Mucous membranes are dry.      Tongue: Lesions present.      Pharynx: Oropharynx is clear.   Pulmonary:      Effort: Pulmonary effort is normal.   Neurological:      General: No focal deficit present.      Mental Status: She is alert and oriented to person, place, and time.                               Assessment and Plan:  Diagnoses and all orders for this visit:    1. Tongue pain (Primary)  -     nystatin (MYCOSTATIN) 100,000 unit/mL suspension; Swish and swallow 5 mL 4 (Four) Times a Day.  Dispense: 473 mL; Refill: 0    2. Oral lesion  -     nystatin (MYCOSTATIN) 100,000 unit/mL suspension; Swish and swallow 5 mL 4 (Four) Times a Day.  Dispense: 473 mL; Refill: 0      Will start with a swab to check for possible bacterial/yeast origin, but we will go ahead and send in nystatin to treat for possible thrush.  Based off of results swab, can treat accordingly.    Follow Up:  No follow-ups on file.    Patient was given instructions and counseling regarding her condition or for health maintenance advice. Please see specific information pulled into the AVS if appropriate.

## 2024-06-21 ENCOUNTER — OFFICE VISIT (OUTPATIENT)
Dept: FAMILY MEDICINE CLINIC | Facility: CLINIC | Age: 64
End: 2024-06-21
Payer: COMMERCIAL

## 2024-06-21 VITALS
HEART RATE: 55 BPM | TEMPERATURE: 96.9 F | OXYGEN SATURATION: 98 % | HEIGHT: 62 IN | SYSTOLIC BLOOD PRESSURE: 128 MMHG | RESPIRATION RATE: 18 BRPM | WEIGHT: 138 LBS | DIASTOLIC BLOOD PRESSURE: 74 MMHG | BODY MASS INDEX: 25.4 KG/M2

## 2024-06-21 DIAGNOSIS — M79.10 MUSCLE PAIN: ICD-10-CM

## 2024-06-21 DIAGNOSIS — H91.90 SUBJECTIVE HEARING CHANGE: Primary | ICD-10-CM

## 2024-06-21 DIAGNOSIS — R19.8 CLENCHING OF TEETH: ICD-10-CM

## 2024-06-21 PROCEDURE — 99213 OFFICE O/P EST LOW 20 MIN: CPT | Performed by: NURSE PRACTITIONER

## 2024-06-21 NOTE — PROGRESS NOTES
Answers submitted by the patient for this visit:  Other (Submitted on 6/14/2024)  Please describe your symptoms.: Mouth pain possible TMJ, Hearing assessment  Have you had these symptoms before?: Yes  How long have you been having these symptoms?: Greater than 2 weeks  Please list any medications you are currently taking for this condition.: Nystatin  Please describe any probable cause for these symptoms. : Stress  Primary Reason for Visit (Submitted on 6/14/2024)  What is the primary reason for your visit?: Other  Chief Complaint  Hearing Problem (Would like to get a hearing test), Jaw Pain (Thinks it's TMJ - has been to the dentist and they say it isn't her teeth), and Thrush (Has been on medication for 8 days wants to know how long to take it - took for 7 and it came right back)    Subjective        Celeste Bui presents to [facility]  History of Present Illness  Not hearing well and states not hearing as well.    TMJ question and dentist doesn't think it is her teeth.   has been caring for  who has had 3 surgeries since the beginning of the year.   did have  dental surgery and is not able to numb her as they can others.  Bite guard and crown put on.  Hearing Problem  Pertinent negatives include no chest pain, coughing, fever, numbness or weakness.   Jaw Pain  Pertinent negatives include no chest pain, coughing, fever, numbness or weakness.       The following portions of the patient's history were personally reviewed and updated as appropriate: allergies, current medications, past medical history, past surgical history, past family history, and past social history.     Body mass index is 25.23 kg/m².           Past History:    Medical History: has a past medical history of Allergies, Colon polyp, Hyperlipidemia, Hypothyroidism, Impingement syndrome of shoulder, right (07/28/2017), Migraines, Patellofemoral syndrome of right knee (07/31/2017), and Thyroid  "disorder.     Surgical History: has a past surgical history that includes Breast biopsy (2018);  section; Colonoscopy; Hysterectomy (); Other surgical history (2016); Carpal tunnel release (Bilateral, 2022); Tubal ligation; and Colonoscopy (N/A, 2024).     Family History: family history includes Celiac disease in her brother; Diabetes in her father; Lung cancer in an other family member; Throat cancer in an other family member; Ulcerative colitis in her brother.     Social History: reports that she has never smoked. She has been exposed to tobacco smoke. She has never used smokeless tobacco. She reports that she does not drink alcohol and does not use drugs.    Allergies: Sulfa antibiotics          Current Outpatient Medications:     nystatin (MYCOSTATIN) 100,000 unit/mL suspension, Swish and swallow 5 mL 4 (Four) Times a Day., Disp: 473 mL, Rfl: 0    Synthroid 125 MCG tablet, Take 0.5 tablets by mouth Daily. For 6 day and a full tablet on 7th day., Disp: 62 tablet, Rfl: 1    Medications Discontinued During This Encounter   Medication Reason    dicyclomine (BENTYL) 20 MG tablet *Therapy completed    diclofenac (VOLTAREN) 50 MG EC tablet *Therapy completed         Review of Systems   Constitutional:  Negative for fever.   Respiratory:  Negative for cough and shortness of breath.    Cardiovascular:  Negative for chest pain, palpitations and leg swelling.   Neurological:  Negative for dizziness, weakness, numbness and headache.        Objective         Vitals:    24 0739   BP: 128/74   BP Location: Right arm   Patient Position: Sitting   Cuff Size: Large Adult   Pulse: 55   Resp: 18   Temp: 96.9 °F (36.1 °C)   TempSrc: Temporal   SpO2: 98%   Weight: 62.6 kg (138 lb)   Height: 157.5 cm (62.01\")     Body mass index is 25.23 kg/m².         Physical Exam  Vitals reviewed.   Constitutional:       Appearance: Normal appearance. She is well-developed.   HENT:      Head: Normocephalic and " atraumatic.      Mouth/Throat:      Pharynx: No oropharyngeal exudate.   Eyes:      Conjunctiva/sclera: Conjunctivae normal.      Pupils: Pupils are equal, round, and reactive to light.   Cardiovascular:      Rate and Rhythm: Normal rate and regular rhythm.      Heart sounds: Normal heart sounds. No murmur heard.     No friction rub. No gallop.   Pulmonary:      Effort: Pulmonary effort is normal.      Breath sounds: Normal breath sounds. No wheezing or rhonchi.   Skin:     General: Skin is warm and dry.   Neurological:      Mental Status: She is alert and oriented to person, place, and time.   Psychiatric:         Mood and Affect: Mood and affect normal.         Behavior: Behavior normal.         Thought Content: Thought content normal.         Judgment: Judgment normal.             Result Review :               Assessment and Plan     Diagnoses and all orders for this visit:    1. Subjective hearing change (Primary)  -     Ambulatory Referral to Audiology    2. Muscle pain    3. Clenching of teeth              Follow Up     Return for Next scheduled follow up.    Patient was given instructions and counseling regarding her condition or for health maintenance advice. Please see specific information pulled into the AVS if appropriate.

## 2024-06-24 ENCOUNTER — PATIENT MESSAGE (OUTPATIENT)
Dept: FAMILY MEDICINE CLINIC | Facility: CLINIC | Age: 64
End: 2024-06-24
Payer: COMMERCIAL

## 2024-06-24 RX ORDER — CLOTRIMAZOLE 10 MG/1
10 LOZENGE ORAL; TOPICAL
Qty: 70 TABLET | Refills: 0 | Status: SHIPPED | OUTPATIENT
Start: 2024-06-24 | End: 2024-07-08

## 2024-06-24 NOTE — TELEPHONE ENCOUNTER
From: Celeste Bui  To: Stephen Bentley  Sent: 6/24/2024 12:51 PM EDT  Subject: Thrush    I have continued the Nystatin and have almost finished the bottle. My tongue is still quite sensitive to salty foods and the cracks don’t appear to be healing. Recommendation?

## 2024-06-26 DIAGNOSIS — H91.90 HEARING LOSS, UNSPECIFIED HEARING LOSS TYPE, UNSPECIFIED LATERALITY: Primary | ICD-10-CM

## 2024-07-12 ENCOUNTER — PROCEDURE VISIT (OUTPATIENT)
Dept: OTOLARYNGOLOGY | Facility: CLINIC | Age: 64
End: 2024-07-12
Payer: COMMERCIAL

## 2024-07-12 DIAGNOSIS — H90.A22 SENSORINEURAL HEARING LOSS (SNHL) OF LEFT EAR WITH RESTRICTED HEARING OF RIGHT EAR: ICD-10-CM

## 2024-07-12 DIAGNOSIS — H93.13 TINNITUS, BILATERAL: ICD-10-CM

## 2024-07-12 DIAGNOSIS — H90.A21 SENSORINEURAL HEARING LOSS (SNHL) OF RIGHT EAR WITH RESTRICTED HEARING OF LEFT EAR: Primary | ICD-10-CM

## 2024-07-12 NOTE — PROGRESS NOTES
AUDIOMETRIC EVALUATION      Name:  Celeste Bui  :  1960  Age:  64 y.o.  Date of Evaluation:  2024       History:  Mrs. Bui is seen today for a hearing evaluation due to hearing loss.    Audiologic Information:  Concerns for Hearing: Yes  PETs: No  Other otologic surgical history: None  Aural Pressure/Fullness: No  Otalgia: None  Otorrhea: No  Tinnitus: Yes each ear  Dizziness: No  Noise Exposure: None  Family history of hearing loss: Father had acoustic neuroma  Head trauma requiring hospital stay: No  Chemotherapy: None  Other significant history: No    EVALUATION:    See audiogram    RESULTS:    Otoscopic Evaluation:  Right: Unremarkable  Left: Unremarkable    Tympanometry (226 Hz):  Right: Type A  Left: Type A    IMPRESSIONS:  Tympanometry showed normal middle ear pressure and static compliance, bilaterally.  Pure tone thresholds for the right ear shows a mild sensorineural hearing loss at 3K hertz.  A moderate sensorineural hearing loss at 4K and 8K hertz.  Pure tone thresholds for the left ear shows a mild sensorineural hearing loss at 3K hertz.  A moderate sensorineural hearing loss at 4K and 8K hertz.  Patient was counseled with regard to the findings.    Amplification needs:  Patient could benefit from amplification if they feel increased communication difficulties.    Diagnosis:  No diagnosis found.     RECOMMENDATIONS/PLAN:  Hearing aid evaluation and counseling upon medical clearance and patient motivation. Patient provided contact information for an audiologist in [facility].  Discussed results and recommendations with patient. Questions were addressed and the patient was encouraged to contact our department should concerns arise.          Oj Pearce M.S, [facility]  Licensed Audiologist

## 2024-07-29 ENCOUNTER — LAB (OUTPATIENT)
Dept: LAB | Facility: HOSPITAL | Age: 64
End: 2024-07-29
Payer: COMMERCIAL

## 2024-07-29 DIAGNOSIS — Z00.00 ANNUAL PHYSICAL EXAM: ICD-10-CM

## 2024-07-29 LAB
ABO GROUP BLD: NORMAL
HBA1C MFR BLD: 5.6 % (ref 4.8–5.6)
RH BLD: POSITIVE

## 2024-07-29 PROCEDURE — 36415 COLL VENOUS BLD VENIPUNCTURE: CPT

## 2024-07-29 PROCEDURE — 83036 HEMOGLOBIN GLYCOSYLATED A1C: CPT

## 2024-07-29 PROCEDURE — 86901 BLOOD TYPING SEROLOGIC RH(D): CPT

## 2024-07-29 PROCEDURE — 86900 BLOOD TYPING SEROLOGIC ABO: CPT

## 2024-07-30 ENCOUNTER — PATIENT MESSAGE (OUTPATIENT)
Dept: FAMILY MEDICINE CLINIC | Facility: CLINIC | Age: 64
End: 2024-07-30
Payer: COMMERCIAL

## 2024-07-30 DIAGNOSIS — Z00.00 ANNUAL PHYSICAL EXAM: Primary | ICD-10-CM

## 2024-08-01 ENCOUNTER — LAB (OUTPATIENT)
Dept: LAB | Facility: HOSPITAL | Age: 64
End: 2024-08-01
Payer: COMMERCIAL

## 2024-08-01 DIAGNOSIS — Z00.00 ANNUAL PHYSICAL EXAM: ICD-10-CM

## 2024-08-01 LAB
ALBUMIN SERPL-MCNC: 4.1 G/DL (ref 3.5–5.2)
ALBUMIN/GLOB SERPL: 1.5 G/DL
ALP SERPL-CCNC: 78 U/L (ref 39–117)
ALT SERPL W P-5'-P-CCNC: 9 U/L (ref 1–33)
ANION GAP SERPL CALCULATED.3IONS-SCNC: 6.6 MMOL/L (ref 5–15)
AST SERPL-CCNC: 15 U/L (ref 1–32)
BASOPHILS # BLD AUTO: 0.02 10*3/MM3 (ref 0–0.2)
BASOPHILS NFR BLD AUTO: 0.4 % (ref 0–1.5)
BILIRUB SERPL-MCNC: 0.7 MG/DL (ref 0–1.2)
BILIRUB UR QL STRIP: NEGATIVE
BUN SERPL-MCNC: 9 MG/DL (ref 8–23)
BUN/CREAT SERPL: 8.7 (ref 7–25)
CALCIUM SPEC-SCNC: 9.4 MG/DL (ref 8.6–10.5)
CHLORIDE SERPL-SCNC: 107 MMOL/L (ref 98–107)
CHOLEST SERPL-MCNC: 268 MG/DL (ref 0–200)
CLARITY UR: CLEAR
CO2 SERPL-SCNC: 26.4 MMOL/L (ref 22–29)
COLOR UR: YELLOW
CREAT SERPL-MCNC: 1.04 MG/DL (ref 0.57–1)
DEPRECATED RDW RBC AUTO: 40.4 FL (ref 37–54)
EGFRCR SERPLBLD CKD-EPI 2021: 60.1 ML/MIN/1.73
EOSINOPHIL # BLD AUTO: 0.11 10*3/MM3 (ref 0–0.4)
EOSINOPHIL NFR BLD AUTO: 2 % (ref 0.3–6.2)
ERYTHROCYTE [DISTWIDTH] IN BLOOD BY AUTOMATED COUNT: 13 % (ref 12.3–15.4)
GLOBULIN UR ELPH-MCNC: 2.8 GM/DL
GLUCOSE SERPL-MCNC: 185 MG/DL (ref 65–99)
GLUCOSE UR STRIP-MCNC: NEGATIVE MG/DL
HCT VFR BLD AUTO: 40.3 % (ref 34–46.6)
HDLC SERPL QL: 5.83
HDLC SERPL-MCNC: 46 MG/DL (ref 40–60)
HGB BLD-MCNC: 13.4 G/DL (ref 12–15.9)
HGB UR QL STRIP.AUTO: NEGATIVE
HOLD SPECIMEN: NORMAL
IMM GRANULOCYTES # BLD AUTO: 0.02 10*3/MM3 (ref 0–0.05)
IMM GRANULOCYTES NFR BLD AUTO: 0.4 % (ref 0–0.5)
KETONES UR QL STRIP: NEGATIVE
LDLC SERPL CALC-MCNC: 200 MG/DL (ref 0–100)
LEUKOCYTE ESTERASE UR QL STRIP.AUTO: NEGATIVE
LYMPHOCYTES # BLD AUTO: 1.64 10*3/MM3 (ref 0.7–3.1)
LYMPHOCYTES NFR BLD AUTO: 30.2 % (ref 19.6–45.3)
MCH RBC QN AUTO: 28.5 PG (ref 26.6–33)
MCHC RBC AUTO-ENTMCNC: 33.3 G/DL (ref 31.5–35.7)
MCV RBC AUTO: 85.7 FL (ref 79–97)
MONOCYTES # BLD AUTO: 0.38 10*3/MM3 (ref 0.1–0.9)
MONOCYTES NFR BLD AUTO: 7 % (ref 5–12)
NEUTROPHILS NFR BLD AUTO: 3.26 10*3/MM3 (ref 1.7–7)
NEUTROPHILS NFR BLD AUTO: 60 % (ref 42.7–76)
NITRITE UR QL STRIP: NEGATIVE
NRBC BLD AUTO-RTO: 0 /100 WBC (ref 0–0.2)
PH UR STRIP.AUTO: 7 [PH] (ref 5–8)
PLATELET # BLD AUTO: 260 10*3/MM3 (ref 140–450)
PMV BLD AUTO: 9.6 FL (ref 6–12)
POTASSIUM SERPL-SCNC: 4.1 MMOL/L (ref 3.5–5.2)
PROT SERPL-MCNC: 6.9 G/DL (ref 6–8.5)
PROT UR QL STRIP: NEGATIVE
RBC # BLD AUTO: 4.7 10*6/MM3 (ref 3.77–5.28)
SODIUM SERPL-SCNC: 140 MMOL/L (ref 136–145)
SP GR UR STRIP: 1.01 (ref 1–1.03)
T4 FREE SERPL-MCNC: 1.38 NG/DL (ref 0.92–1.68)
TRIGL SERPL-MCNC: 123 MG/DL (ref 0–150)
TSH SERPL DL<=0.05 MIU/L-ACNC: 0.27 UIU/ML (ref 0.27–4.2)
UROBILINOGEN UR QL STRIP: NORMAL
VLDLC SERPL-MCNC: 22 MG/DL (ref 5–40)
WBC NRBC COR # BLD AUTO: 5.43 10*3/MM3 (ref 3.4–10.8)

## 2024-08-01 PROCEDURE — 80050 GENERAL HEALTH PANEL: CPT

## 2024-08-01 PROCEDURE — 80061 LIPID PANEL: CPT

## 2024-08-01 PROCEDURE — 81003 URINALYSIS AUTO W/O SCOPE: CPT

## 2024-08-01 PROCEDURE — 84439 ASSAY OF FREE THYROXINE: CPT

## 2024-08-01 PROCEDURE — 36415 COLL VENOUS BLD VENIPUNCTURE: CPT

## 2024-08-05 ENCOUNTER — OFFICE VISIT (OUTPATIENT)
Dept: FAMILY MEDICINE CLINIC | Facility: CLINIC | Age: 64
End: 2024-08-05
Payer: COMMERCIAL

## 2024-08-05 VITALS
HEIGHT: 62 IN | OXYGEN SATURATION: 96 % | BODY MASS INDEX: 25.54 KG/M2 | WEIGHT: 138.8 LBS | SYSTOLIC BLOOD PRESSURE: 112 MMHG | TEMPERATURE: 97.1 F | HEART RATE: 71 BPM | RESPIRATION RATE: 18 BRPM | DIASTOLIC BLOOD PRESSURE: 78 MMHG

## 2024-08-05 DIAGNOSIS — Z12.31 ENCOUNTER FOR SCREENING MAMMOGRAM FOR MALIGNANT NEOPLASM OF BREAST: ICD-10-CM

## 2024-08-05 DIAGNOSIS — K13.70 MOUTH LESION: ICD-10-CM

## 2024-08-05 DIAGNOSIS — E07.9 THYROID DISORDER: ICD-10-CM

## 2024-08-05 DIAGNOSIS — Z00.00 ANNUAL PHYSICAL EXAM: Primary | ICD-10-CM

## 2024-08-05 PROCEDURE — 99396 PREV VISIT EST AGE 40-64: CPT | Performed by: NURSE PRACTITIONER

## 2024-08-05 RX ORDER — LEVOTHYROXINE SODIUM 125 MCG
62.5 TABLET ORAL DAILY
Qty: 62 TABLET | Refills: 1 | Status: SHIPPED | OUTPATIENT
Start: 2024-08-05

## 2024-08-05 NOTE — PROGRESS NOTES
Chief Complaint  Annual Exam and Mouth Lesions (Tongue pain - would like to be referred to ENT, brother that had oral cancer)    Subjective        Celeste Bui presents to [facility]  History of Present Illness  Mouth lesions. And took magic mouth wash and states that found that mouth guard when stopped got better.  States that thinks that may be allergic.    States that has one spot on tongue still even though the rest has cleared.  Brother with mouth cancer so would like to have further evaluated.  Mouth Lesions   Associated symptoms include mouth sores. Pertinent negatives include no fever and no cough.       The following portions of the patient's history were personally reviewed and updated as appropriate: allergies, current medications, past medical history, past surgical history, past family history, and past social history.     Body mass index is 25.38 kg/m².           Past History:    Medical History: has a past medical history of Allergies, Colon polyp, Hyperlipidemia, Hypothyroidism, Impingement syndrome of shoulder, right (2017), Migraines, Patellofemoral syndrome of right knee (2017), and Thyroid disorder.     Surgical History: has a past surgical history that includes Breast biopsy ();  section; Colonoscopy; Hysterectomy (); Other surgical history (); Carpal tunnel release (Bilateral, 2022); Tubal ligation; and Colonoscopy (N/A, 2024).     Family History: family history includes Celiac disease in her brother; Diabetes in her father; Lung cancer in an other family member; Throat cancer in an other family member; Ulcerative colitis in her brother.     Social History: reports that she has never smoked. She has been exposed to tobacco smoke. She has never used smokeless tobacco. She reports that she does not drink alcohol and does not use drugs.    Allergies: Sulfa antibiotics          Current Outpatient Medications:      "Synthroid 125 MCG tablet, Take 0.5 tablets by mouth Daily. For 6 day and a full tablet on 7th day., Disp: 62 tablet, Rfl: 1    Medications Discontinued During This Encounter   Medication Reason    Synthroid 125 MCG tablet Reorder         Review of Systems   Constitutional:  Negative for fever.   HENT:  Positive for mouth sores.    Respiratory:  Negative for cough and shortness of breath.    Cardiovascular:  Negative for chest pain, palpitations and leg swelling.   Neurological:  Negative for dizziness, weakness, numbness and headache.        Objective         Vitals:    08/05/24 0657   BP: 112/78   BP Location: Right arm   Patient Position: Sitting   Cuff Size: Adult   Pulse: 71   Resp: 18   Temp: 97.1 °F (36.2 °C)   TempSrc: Temporal   SpO2: 96%   Weight: 63 kg (138 lb 12.8 oz)   Height: 157.5 cm (62.01\")     Body mass index is 25.38 kg/m².         Physical Exam  Vitals reviewed.   Constitutional:       Appearance: Normal appearance. She is well-developed.   HENT:      Head: Normocephalic and atraumatic.      Mouth/Throat:      Pharynx: No oropharyngeal exudate.        Comments: Reddened area noted to tongue.  Eyes:      Conjunctiva/sclera: Conjunctivae normal.      Pupils: Pupils are equal, round, and reactive to light.   Cardiovascular:      Rate and Rhythm: Normal rate and regular rhythm.      Heart sounds: Normal heart sounds. No murmur heard.     No friction rub. No gallop.   Pulmonary:      Effort: Pulmonary effort is normal.      Breath sounds: Normal breath sounds. No wheezing or rhonchi.   Skin:     General: Skin is warm and dry.   Neurological:      Mental Status: She is alert and oriented to person, place, and time.   Psychiatric:         Mood and Affect: Mood and affect normal.         Behavior: Behavior normal.         Thought Content: Thought content normal.         Judgment: Judgment normal.             Result Review :               Assessment and Plan     Diagnoses and all orders for this " visit:    1. Annual physical exam (Primary)  Comments:  discussed diet and exercise    2. Mouth lesion  -     Ambulatory Referral to ENT (Otolaryngology)    3. Thyroid disorder  -     TSH; Future  -     Synthroid 125 MCG tablet; Take 0.5 tablets by mouth Daily. For 6 day and a full tablet on 7th day.  Dispense: 62 tablet; Refill: 1    4. Encounter for screening mammogram for malignant neoplasm of breast  -     Cancel: Mammo Screening Digital Tomosynthesis Bilateral With CAD; Future  -     Mammo Screening Digital Tomosynthesis Bilateral With CAD; Future      Will continue to check A1C since normal and glucose elevated.        Follow Up     Return in about 1 year (around 8/5/2025).    Patient was given instructions and counseling regarding her condition or for health maintenance advice. Please see specific information pulled into the AVS if appropriate.

## 2024-08-26 ENCOUNTER — OFFICE VISIT (OUTPATIENT)
Dept: FAMILY MEDICINE CLINIC | Facility: CLINIC | Age: 64
End: 2024-08-26
Payer: COMMERCIAL

## 2024-08-26 VITALS
DIASTOLIC BLOOD PRESSURE: 72 MMHG | OXYGEN SATURATION: 97 % | SYSTOLIC BLOOD PRESSURE: 118 MMHG | HEART RATE: 64 BPM | BODY MASS INDEX: 25.69 KG/M2 | TEMPERATURE: 96.5 F | HEIGHT: 62 IN | RESPIRATION RATE: 16 BRPM | WEIGHT: 139.6 LBS

## 2024-08-26 DIAGNOSIS — H10.31 ACUTE CONJUNCTIVITIS OF RIGHT EYE, UNSPECIFIED ACUTE CONJUNCTIVITIS TYPE: Primary | ICD-10-CM

## 2024-08-26 PROCEDURE — 99213 OFFICE O/P EST LOW 20 MIN: CPT | Performed by: NURSE PRACTITIONER

## 2024-08-26 RX ORDER — CEPHALEXIN 500 MG/1
500 CAPSULE ORAL 3 TIMES DAILY
Qty: 30 CAPSULE | Refills: 0 | Status: SHIPPED | OUTPATIENT
Start: 2024-08-26

## 2024-08-26 RX ORDER — NEOMYCIN SULFATE, POLYMYXIN B SULFATE AND GRAMICIDIN 1.75; 10000; .025 MG/ML; [USP'U]/ML; MG/ML
2 SOLUTION/ DROPS OPHTHALMIC EVERY 6 HOURS SCHEDULED
Qty: 10 ML | Refills: 0 | Status: SHIPPED | OUTPATIENT
Start: 2024-08-26

## 2024-08-26 NOTE — PROGRESS NOTES
Chief Complaint  Eye Problem (right)    Subjective        Celeste Bui presents to [facility]  History of Present Illness  Eye swelling that occurs transiently.  States that started last week and saw optometry and was give good news of nothing of note wrong with vision.  Still having swelling and conjunctivitis.  Eye Problem   Pertinent negatives include no fever or weakness.       The following portions of the patient's history were personally reviewed and updated as appropriate: allergies, current medications, past medical history, past surgical history, past family history, and past social history.     Body mass index is 25.53 kg/m².           Past History:    Medical History: has a past medical history of Allergies, Colon polyp, HL (hearing loss), Hyperlipidemia, Hypothyroidism, Impingement syndrome of shoulder, right (2017), Migraines, Patellofemoral syndrome of right knee (2017), and Thyroid disorder.     Surgical History: has a past surgical history that includes Breast biopsy ();  section; Colonoscopy; Hysterectomy (); Other surgical history (); Carpal tunnel release (Bilateral, 2022); Tubal ligation; and Colonoscopy (N/A, 2024).     Family History: family history includes Celiac disease in her brother; Diabetes in her father; Lung cancer in an other family member; Throat cancer in an other family member; Ulcerative colitis in her brother.     Social History: reports that she has never smoked. She has been exposed to tobacco smoke. She has never used smokeless tobacco. She reports that she does not drink alcohol and does not use drugs.    Allergies: Sulfa antibiotics          Current Outpatient Medications:     Synthroid 125 MCG tablet, Take 0.5 tablets by mouth Daily. For 6 day and a full tablet on 7th day., Disp: 62 tablet, Rfl: 1    cephalexin (Keflex) 500 MG capsule, Take 1 capsule by mouth 3 (Three) Times a Day., Disp: 30  "capsule, Rfl: 0    neomycin-polymyxin-gramicidin (NEOSPORIN) 1.75-99137-.025 ophthalmic solution, Administer 2 drops to the right eye Every 6 (Six) Hours., Disp: 10 mL, Rfl: 0    There are no discontinued medications.      Review of Systems   Constitutional:  Negative for fever.   Respiratory:  Negative for cough and shortness of breath.    Cardiovascular:  Negative for chest pain, palpitations and leg swelling.   Neurological:  Negative for dizziness, weakness, numbness and headache.        Objective         Vitals:    08/26/24 1004   BP: 118/72   BP Location: Right arm   Patient Position: Sitting   Cuff Size: Adult   Pulse: 64   Resp: 16   Temp: 96.5 °F (35.8 °C)   TempSrc: Temporal   SpO2: 97%   Weight: 63.3 kg (139 lb 9.6 oz)   Height: 157.5 cm (62.01\")     Body mass index is 25.53 kg/m².         Physical Exam  Vitals reviewed.   Constitutional:       Appearance: Normal appearance. She is well-developed.   HENT:      Head: Normocephalic and atraumatic.      Mouth/Throat:      Pharynx: No oropharyngeal exudate.   Eyes:      Extraocular Movements: Extraocular movements intact.      Right eye: Normal extraocular motion.      Conjunctiva/sclera: Conjunctivae normal.      Right eye: Right conjunctiva is injected.      Pupils: Pupils are equal, round, and reactive to light.   Cardiovascular:      Rate and Rhythm: Normal rate and regular rhythm.      Heart sounds: Normal heart sounds. No murmur heard.     No friction rub. No gallop.   Pulmonary:      Effort: Pulmonary effort is normal.      Breath sounds: Normal breath sounds. No wheezing or rhonchi.   Skin:     General: Skin is warm and dry.   Neurological:      Mental Status: She is alert and oriented to person, place, and time.   Psychiatric:         Mood and Affect: Mood and affect normal.         Behavior: Behavior normal.         Thought Content: Thought content normal.         Judgment: Judgment normal.             Result Review :               Assessment and " Plan     Diagnoses and all orders for this visit:    1. Acute conjunctivitis of right eye, unspecified acute conjunctivitis type (Primary)  -     cephalexin (Keflex) 500 MG capsule; Take 1 capsule by mouth 3 (Three) Times a Day.  Dispense: 30 capsule; Refill: 0  -     neomycin-polymyxin-gramicidin (NEOSPORIN) 1.75-62298-.025 ophthalmic solution; Administer 2 drops to the right eye Every 6 (Six) Hours.  Dispense: 10 mL; Refill: 0              Follow Up     Return for Next scheduled follow up.    Patient was given instructions and counseling regarding her condition or for health maintenance advice. Please see specific information pulled into the AVS if appropriate.

## 2024-09-20 ENCOUNTER — OFFICE VISIT (OUTPATIENT)
Dept: OTOLARYNGOLOGY | Facility: CLINIC | Age: 64
End: 2024-09-20
Payer: COMMERCIAL

## 2024-09-20 VITALS — HEIGHT: 62 IN | WEIGHT: 139.6 LBS | BODY MASS INDEX: 25.69 KG/M2 | TEMPERATURE: 97.8 F

## 2024-09-20 DIAGNOSIS — E03.9 HYPOTHYROIDISM (ACQUIRED): ICD-10-CM

## 2024-09-20 DIAGNOSIS — K14.8 TONGUE LESION: Primary | ICD-10-CM

## 2024-11-04 ENCOUNTER — LAB (OUTPATIENT)
Dept: LAB | Facility: HOSPITAL | Age: 64
End: 2024-11-04
Payer: COMMERCIAL

## 2024-11-04 DIAGNOSIS — E07.9 THYROID DISORDER: ICD-10-CM

## 2024-11-04 LAB — TSH SERPL DL<=0.05 MIU/L-ACNC: 0.55 UIU/ML (ref 0.27–4.2)

## 2024-11-04 PROCEDURE — 84443 ASSAY THYROID STIM HORMONE: CPT

## 2024-11-04 PROCEDURE — 36415 COLL VENOUS BLD VENIPUNCTURE: CPT

## 2024-11-21 ENCOUNTER — HOSPITAL ENCOUNTER (OUTPATIENT)
Dept: GENERAL RADIOLOGY | Facility: HOSPITAL | Age: 64
Discharge: HOME | End: 2024-11-21
Admitting: NURSE PRACTITIONER
Payer: COMMERCIAL

## 2024-11-21 ENCOUNTER — OFFICE VISIT (OUTPATIENT)
Dept: FAMILY MEDICINE CLINIC | Facility: CLINIC | Age: 64
End: 2024-11-21
Payer: COMMERCIAL

## 2024-11-21 VITALS
OXYGEN SATURATION: 96 % | HEART RATE: 82 BPM | TEMPERATURE: 97.3 F | WEIGHT: 142.8 LBS | BODY MASS INDEX: 26.12 KG/M2 | DIASTOLIC BLOOD PRESSURE: 76 MMHG | RESPIRATION RATE: 18 BRPM | SYSTOLIC BLOOD PRESSURE: 120 MMHG

## 2024-11-21 DIAGNOSIS — M25.561 ACUTE PAIN OF RIGHT KNEE: Primary | ICD-10-CM

## 2024-11-21 DIAGNOSIS — M25.561 ACUTE PAIN OF RIGHT KNEE: ICD-10-CM

## 2024-11-21 PROCEDURE — 99213 OFFICE O/P EST LOW 20 MIN: CPT | Performed by: NURSE PRACTITIONER

## 2024-11-21 PROCEDURE — 73562 X-RAY EXAM OF KNEE 3: CPT

## 2024-11-21 NOTE — PROGRESS NOTES
Answers submitted by the patient for this visit:  Primary Reason for Visit (Submitted on 2024)  What is the primary reason for your visit?: Extremity Pain  Lower Extremity Injury Questionnaire (Submitted on 2024)  Chief Complaint: Extremity pain  Injury: No  Pain location: right knee  Pain quality: aching  Pain - numeric: 4/10  Progression since onset: comes and goes  Additional information: Achy pain on and off daily  Chief Complaint  Hypothyroidism and Knee Pain (Right knee)    Subjective        Celeste Bui presents to [facility]  Hypothyroidism  Patient reports no palpitations.   Knee Pain   Pertinent negatives include no numbness.       The following portions of the patient's history were personally reviewed and updated as appropriate: allergies, current medications, past medical history, past surgical history, past family history, and past social history.     Body mass index is 26.12 kg/m².           Past History:    Medical History: has a past medical history of Allergies, Colon polyp, HL (hearing loss), Hyperlipidemia, Hypothyroidism, Impingement syndrome of shoulder, right (2017), Migraines, Patellofemoral syndrome of right knee (2017), and Thyroid disorder.     Surgical History: has a past surgical history that includes Breast biopsy ();  section; Colonoscopy; Hysterectomy (); Other surgical history (); Carpal tunnel release (Bilateral, 2022); Tubal ligation; and Colonoscopy (N/A, 2024).     Family History: family history includes Celiac disease in her brother; Diabetes in her father; Lung cancer in an other family member; Throat cancer in an other family member; Ulcerative colitis in her brother.     Social History: reports that she has never smoked. She has been exposed to tobacco smoke. She has never used smokeless tobacco. She reports that she does not drink alcohol and does not use drugs.    Allergies: Sulfa  antibiotics          Current Outpatient Medications:     Synthroid 125 MCG tablet, Take 0.5 tablets by mouth Daily. For 6 day and a full tablet on 7th day., Disp: 62 tablet, Rfl: 1    There are no discontinued medications.      Review of Systems   Constitutional:  Negative for fever.   Respiratory:  Negative for cough and shortness of breath.    Cardiovascular:  Negative for chest pain, palpitations and leg swelling.   Neurological:  Negative for dizziness, weakness, numbness and headache.        Objective         Vitals:    11/21/24 1312   BP: 120/76   BP Location: Right arm   Patient Position: Sitting   Cuff Size: Adult   Pulse: 82   Resp: 18   Temp: 97.3 °F (36.3 °C)   TempSrc: Temporal   SpO2: 96%   Weight: 64.8 kg (142 lb 12.8 oz)     Body mass index is 26.12 kg/m².         Physical Exam  Vitals reviewed.   Constitutional:       Appearance: Normal appearance. She is well-developed.   HENT:      Head: Normocephalic and atraumatic.      Mouth/Throat:      Pharynx: No oropharyngeal exudate.   Eyes:      Conjunctiva/sclera: Conjunctivae normal.      Pupils: Pupils are equal, round, and reactive to light.   Cardiovascular:      Rate and Rhythm: Normal rate and regular rhythm.      Heart sounds: Normal heart sounds. No murmur heard.     No friction rub. No gallop.   Pulmonary:      Effort: Pulmonary effort is normal.      Breath sounds: Normal breath sounds. No wheezing or rhonchi.   Musculoskeletal:        Legs:       Comments: Tenderness to palpation.   Skin:     General: Skin is warm and dry.   Neurological:      Mental Status: She is alert and oriented to person, place, and time.   Psychiatric:         Mood and Affect: Mood and affect normal.         Behavior: Behavior normal.         Thought Content: Thought content normal.         Judgment: Judgment normal.             Result Review :               Assessment and Plan     Diagnoses and all orders for this visit:    1. Acute pain of right knee (Primary)  -      XR Knee 3 View Right; Future  -     Ambulatory Referral to Physical Therapy for Evaluation & Treatment  -     Ambulatory Referral to Orthopedic Surgery              Follow Up     No follow-ups on file.    Patient was given instructions and counseling regarding her condition or for health maintenance advice. Please see specific information pulled into the AVS if appropriate.

## 2024-12-03 ENCOUNTER — OFFICE VISIT (OUTPATIENT)
Dept: ORTHOPEDIC SURGERY | Facility: CLINIC | Age: 64
End: 2024-12-03
Payer: COMMERCIAL

## 2024-12-03 VITALS
HEIGHT: 62 IN | WEIGHT: 142 LBS | SYSTOLIC BLOOD PRESSURE: 130 MMHG | DIASTOLIC BLOOD PRESSURE: 82 MMHG | OXYGEN SATURATION: 95 % | HEART RATE: 66 BPM | BODY MASS INDEX: 26.13 KG/M2

## 2024-12-03 DIAGNOSIS — M70.51 PES ANSERINUS BURSITIS OF RIGHT KNEE: ICD-10-CM

## 2024-12-03 DIAGNOSIS — M25.561 RIGHT KNEE PAIN, UNSPECIFIED CHRONICITY: Primary | ICD-10-CM

## 2024-12-03 RX ORDER — DICLOFENAC SODIUM 75 MG/1
75 TABLET, DELAYED RELEASE ORAL 2 TIMES DAILY
Qty: 60 TABLET | Refills: 1 | Status: SHIPPED | OUTPATIENT
Start: 2024-12-03

## 2024-12-03 NOTE — PROGRESS NOTES
"Chief Complaint  Initial Evaluation of the Right Knee     Subjective      Celeste Bui presents to [facility] for initial evaluation of the right knee.  She had an X ray on 11/21/24 and is here to review.  She has pain on the right knee for the last 5 weeks on the medial aspect of the knee.  The pain is on stairs, uneven ground, stooping and squatting.  The pain waxes and wanes.      Allergies   Allergen Reactions    Sulfa Antibiotics Anaphylaxis        Social History     Socioeconomic History    Marital status:    Tobacco Use    Smoking status: Never     Passive exposure: Past    Smokeless tobacco: Never   Vaping Use    Vaping status: Never Used   Substance and Sexual Activity    Alcohol use: Never    Drug use: Never    Sexual activity: Yes     Partners: Male     Birth control/protection: None, Hysterectomy        I reviewed the patient's chief complaint, history of present illness, review of systems, past medical history, surgical history, family history, social history, medications, and allergy list.     Review of Systems     Constitutional: Denies fevers, chills, weight loss  Cardiovascular: Denies chest pain, shortness of breath  Skin: Denies rashes, acute skin changes  Neurologic: Denies headache, loss of consciousness      Vital Signs:   /82   Pulse 66   Ht 157.5 cm (62\")   Wt 64.4 kg (142 lb)   SpO2 95%   BMI 25.97 kg/m²          Physical Exam  General: Alert. No acute distress    Ortho Exam        RIGHT KNEE Flexion 125. Extension 0. Stable to varus/valgus stress. Stable to anterior/posterior drawer. Neurovascularly intact. Pain with  Harjeet. Negative Lachman. Positive EHL, FHL, HS and TA. Sensation intact to light touch all 5 nerves of the foot. Ambulates with Antalgic gait. Patella is well tracking. Calf supple, non-tender. Positive tenderness to the medial joint line. Negative tenderness to the lateral joint line. Negative Crepitus. Good strength to " hamstrings, quadriceps, dorsiflexors, and plantar flexors.  Knee Extensor Mechanism intact     Procedures      Imaging Results (Most Recent)       None             Result Review :         XR Knee 3 View Right    Result Date: 11/24/2024  Narrative: XR KNEE 3 VW RIGHT Date of Exam: 11/21/2024 1:42 PM EST Indication: knee pain Comparison: 7/28/2017. Findings: No evidence of fracture. No evidence of dislocation. No joint effusion identified. No focal soft tissue abnormality is identified. Mild osteoarthritic changes are present, most pronounced within the medial compartment.     Impression: Impression: No acute osseous abnormality. Mild osteoarthritic changes are present. Electronically Signed: Marisol Wallace MD  11/24/2024 2:42 PM EST  Workstation ID: [id number]            Assessment and Plan     Diagnoses and all orders for this visit:    1. Right knee pain, unspecified chronicity (Primary)    2. Pes anserinus bursitis of right knee        Discussed the treatment plan with the patient. I reviewed the X-rays that were obtained 11/21/24 with the patient.     Prescribed anti inflammatory.      HEP exercises given.  Prescribed physical therapy.      Call or return if worsening symptoms.    Follow Up     PRN.       Patient was given instructions and counseling regarding her condition or for health maintenance advice. Please see specific information pulled into the AVS if appropriate.     Scribed for Kermit Whitney MD by Laureen Paul MA.  12/03/24   10:51 EST    I have personally performed the services described in this document as scribed by the above individual and it is both accurate and complete. Kermit Whitney MD 12/04/24

## 2025-01-14 ENCOUNTER — OFFICE VISIT (OUTPATIENT)
Dept: ORTHOPEDIC SURGERY | Facility: CLINIC | Age: 65
End: 2025-01-14
Payer: COMMERCIAL

## 2025-01-14 VITALS
DIASTOLIC BLOOD PRESSURE: 81 MMHG | HEART RATE: 63 BPM | BODY MASS INDEX: 24.84 KG/M2 | WEIGHT: 135 LBS | HEIGHT: 62 IN | SYSTOLIC BLOOD PRESSURE: 148 MMHG

## 2025-01-14 DIAGNOSIS — M70.51 PES ANSERINUS BURSITIS OF RIGHT KNEE: ICD-10-CM

## 2025-01-14 DIAGNOSIS — M25.561 RIGHT KNEE PAIN, UNSPECIFIED CHRONICITY: Primary | ICD-10-CM

## 2025-01-14 RX ORDER — TRIAMCINOLONE ACETONIDE 40 MG/ML
40 INJECTION, SUSPENSION INTRA-ARTICULAR; INTRAMUSCULAR
Status: COMPLETED | OUTPATIENT
Start: 2025-01-14 | End: 2025-01-14

## 2025-01-14 RX ORDER — LIDOCAINE HYDROCHLORIDE 10 MG/ML
2 INJECTION, SOLUTION INFILTRATION; PERINEURAL
Status: COMPLETED | OUTPATIENT
Start: 2025-01-14 | End: 2025-01-14

## 2025-01-14 RX ORDER — DICLOFENAC SODIUM 75 MG/1
75 TABLET, DELAYED RELEASE ORAL 2 TIMES DAILY
Qty: 60 TABLET | Refills: 1 | Status: SHIPPED | OUTPATIENT
Start: 2025-01-14

## 2025-01-14 RX ADMIN — LIDOCAINE HYDROCHLORIDE 2 ML: 10 INJECTION, SOLUTION INFILTRATION; PERINEURAL at 08:51

## 2025-01-14 RX ADMIN — TRIAMCINOLONE ACETONIDE 40 MG: 40 INJECTION, SUSPENSION INTRA-ARTICULAR; INTRAMUSCULAR at 08:51

## 2025-01-14 NOTE — PROGRESS NOTES
"Chief Complaint  Follow-up of the Right Knee     Subjective      Celeste Bui presents to [facility] for follow up of the right knee. She started doing the exercises and started doing yoga on 12/26/24 and had increase pain.  She is taking anti inflammatory.  She was doing better until the child's pose.  When she shifted right she felt a twinge in her knee.  The pain is on the medial aspect of the knee.      Allergies   Allergen Reactions    Sulfa Antibiotics Anaphylaxis        Social History     Socioeconomic History    Marital status:    Tobacco Use    Smoking status: Never     Passive exposure: Past    Smokeless tobacco: Never   Vaping Use    Vaping status: Never Used   Substance and Sexual Activity    Alcohol use: Never    Drug use: Never    Sexual activity: Yes     Partners: Male     Birth control/protection: None, Hysterectomy        I reviewed the patient's chief complaint, history of present illness, review of systems, past medical history, surgical history, family history, social history, medications, and allergy list.     Review of Systems     Constitutional: Denies fevers, chills, weight loss  Cardiovascular: Denies chest pain, shortness of breath  Skin: Denies rashes, acute skin changes  Neurologic: Denies headache, loss of consciousness        Vital Signs:   /81   Pulse 63   Ht 157.5 cm (62\")   Wt 61.2 kg (135 lb)   BMI 24.69 kg/m²          Physical Exam  General: Alert. No acute distress    Ortho Exam        RIGHT KNEE Flexion 125. Extension 0. Stable to varus/valgus stress. Stable to anterior/posterior drawer. Neurovascularly intact. Pain with  Harjeet. Negative Lachman. Positive EHL, FHL, HS and TA. Sensation intact to light touch all 5 nerves of the foot. Ambulates with Antalgic gait. Patella is well tracking. Calf supple, non-tender. Positive tenderness to the medial joint line. Negative tenderness to the lateral joint line. Negative Crepitus. " Good strength to hamstrings, quadriceps, dorsiflexors, and plantar flexors.  Knee Extensor Mechanism intact       Large Joint Arthrocentesis  Date/Time: 1/14/2025 8:51 AM  Consent given by: patient  Site marked: site marked  Timeout: Immediately prior to procedure a time out was called to verify the correct patient, procedure, equipment, support staff and site/side marked as required   Supporting Documentation  Indications: pain   Procedure Details  Location: -   Location: right knee.Needle gauge: 21 G.  Medications administered: 2 mL lidocaine 1 %; 40 mg triamcinolone acetonide 40 MG/ML  Patient tolerance: patient tolerated the procedure well with no immediate complications    This injection documentation was Scribed for Kermit Whitney MD by Tita Wilder MA.  01/14/25   08:51 EST      Imaging Results (Most Recent)       None             Result Review :               Assessment and Plan     Diagnoses and all orders for this visit:    1. Right knee pain, unspecified chronicity (Primary)    2. Pes anserinus bursitis of right knee        Discussed the treatment plan with the patient.     Continue anti inflammatory as needed.  HEP exercises discussed.  Refilled anti inflammatory.      Discussed the risks and benefits of conservative measures. The patient expressed understanding and wished to proceed with a right knee pes bursitis steroid injection.  She tolerated the injection well.       Call or return if worsening symptoms.    Follow Up     PRN      Patient was given instructions and counseling regarding her condition or for health maintenance advice. Please see specific information pulled into the AVS if appropriate.     Scribed for Kermit Whitney MD by Laureen Paul MA.  01/14/25   08:31 EST    I have personally performed the services described in this document as scribed by the above individual and it is both accurate and complete. Kermit Whitney MD 01/14/25

## 2025-02-21 ENCOUNTER — HOSPITAL ENCOUNTER (OUTPATIENT)
Dept: MAMMOGRAPHY | Facility: HOSPITAL | Age: 65
Discharge: HOME | End: 2025-02-21
Admitting: NURSE PRACTITIONER
Payer: MEDICARE

## 2025-02-21 DIAGNOSIS — Z12.31 ENCOUNTER FOR SCREENING MAMMOGRAM FOR MALIGNANT NEOPLASM OF BREAST: ICD-10-CM

## 2025-02-21 PROCEDURE — 77067 SCR MAMMO BI INCL CAD: CPT

## 2025-02-21 PROCEDURE — 77063 BREAST TOMOSYNTHESIS BI: CPT

## 2025-03-27 ENCOUNTER — OFFICE VISIT (OUTPATIENT)
Dept: FAMILY MEDICINE CLINIC | Facility: CLINIC | Age: 65
End: 2025-03-27
Payer: MEDICARE

## 2025-03-27 VITALS
SYSTOLIC BLOOD PRESSURE: 126 MMHG | HEART RATE: 66 BPM | OXYGEN SATURATION: 97 % | HEIGHT: 62 IN | DIASTOLIC BLOOD PRESSURE: 80 MMHG | WEIGHT: 141.4 LBS | BODY MASS INDEX: 26.02 KG/M2 | TEMPERATURE: 97.5 F

## 2025-03-27 DIAGNOSIS — N39.3 STRESS INCONTINENCE: Primary | ICD-10-CM

## 2025-03-27 PROCEDURE — 99213 OFFICE O/P EST LOW 20 MIN: CPT | Performed by: NURSE PRACTITIONER

## 2025-03-27 PROCEDURE — 1159F MED LIST DOCD IN RCRD: CPT | Performed by: NURSE PRACTITIONER

## 2025-03-27 PROCEDURE — 1160F RVW MEDS BY RX/DR IN RCRD: CPT | Performed by: NURSE PRACTITIONER

## 2025-03-27 PROCEDURE — 1125F AMNT PAIN NOTED PAIN PRSNT: CPT | Performed by: NURSE PRACTITIONER

## 2025-03-27 NOTE — PROGRESS NOTES
Answers submitted by the patient for this visit:  Problem not listed (Submitted on 3/20/2025)  Chief Complaint: Other medical problem  Reason for appointment: Bladder leakage  abdominal pain: No  anorexia: No  joint pain: No  change in stool: No  chest pain: No  chills: No  nasal congestion: No  cough: No  diaphoresis: No  fatigue: No  fever: No  headaches: No  joint swelling: No  myalgias: No  nausea: No  neck pain: No  numbness: No  rash: No  sore throat: No  swollen glands: No  vertigo: No  visual change: No  vomiting: No  weakness: No  Onset: more than 5 years  Chronicity: recurrent  Frequency: daily  Medications tried: None  Chief Complaint  Bladder leakage (X2 years /Would like a referral for physical therapy for pelvic health)    Subjective        Celeste Bui presents to [facility]  History of Present Illness  Bladder leakage:  has had for years.  Stress incontinence.  Has been doing physical therapy on leg and found they also do pelvic floor therapy.  She states that the stress incontinence comes and goes it is not constant.  Would like to try pelvic floor therapy.  She notes that she has not felt any pressure bulging from her vaginal vault like with a cystocele.    Bladder leakage  Symptoms are: recurrent.   Onset was more than 5 years.   Symptoms occur: daily.  Pertinent negative symptoms include no abdominal pain, no anorexia, no joint pain, no change in stool, no chest pain, no chills, no congestion, no cough, no diaphoresis, no fatigue, no fever, no headaches, no joint swelling, no myalgias, no nausea, no neck pain, no numbness, no rash, no sore throat, no swollen glands, no vertigo, no visual change, no vomiting and no weakness.   Treatment and/or Medications comments include: None       The following portions of the patient's history were personally reviewed and updated as appropriate: allergies, current medications, past medical history, past surgical history,  past family history, and past social history.     Body mass index is 25.86 kg/m².           Past History:    Medical History: has a past medical history of Allergies, Colon polyp, HL (hearing loss), Hyperlipidemia, Hypothyroidism, Impingement syndrome of shoulder, right (2017), Migraines, Patellofemoral syndrome of right knee (2017), Tendinitis of knee, and Thyroid disorder.     Surgical History: has a past surgical history that includes Breast biopsy ();  section; Colonoscopy; Hysterectomy (); Other surgical history (); Carpal tunnel release (Bilateral, 2022); Tubal ligation; Colonoscopy (N/A, 2024); and Hand surgery.     Family History: family history includes Cancer in her father; Celiac disease in her brother; Diabetes in her father; Lung cancer in an other family member; Throat cancer in an other family member; Ulcerative colitis in her brother.     Social History: reports that she has never smoked. She has been exposed to tobacco smoke. She has never used smokeless tobacco. She reports that she does not drink alcohol and does not use drugs.    Allergies: Sulfa antibiotics          Current Outpatient Medications:     Synthroid 125 MCG tablet, Take 0.5 tablets by mouth Daily. For 6 day and a full tablet on 7th day., Disp: 62 tablet, Rfl: 1    Medications Discontinued During This Encounter   Medication Reason    diclofenac (VOLTAREN) 75 MG EC tablet *Therapy completed         Review of Systems   Constitutional:  Negative for chills, diaphoresis, fatigue and fever.   HENT:  Negative for congestion, sore throat and swollen glands.    Respiratory:  Negative for cough.    Cardiovascular:  Negative for chest pain.   Gastrointestinal:  Negative for abdominal pain, anorexia, nausea and vomiting.   Musculoskeletal:  Negative for joint pain, myalgias and neck pain.   Skin:  Negative for rash.   Neurological:  Negative for vertigo, weakness and numbness.        Objective  "        Vitals:    03/27/25 1043   BP: 126/80   BP Location: Left arm   Patient Position: Sitting   Cuff Size: Adult   Pulse: 66   Temp: 97.5 °F (36.4 °C)   SpO2: 97%   Weight: 64.1 kg (141 lb 6.4 oz)   Height: 157.5 cm (62.01\")     Body mass index is 25.86 kg/m².         Physical Exam  Vitals reviewed.   Constitutional:       Appearance: Normal appearance. She is well-developed.   HENT:      Head: Normocephalic and atraumatic.      Mouth/Throat:      Pharynx: No oropharyngeal exudate.   Eyes:      Conjunctiva/sclera: Conjunctivae normal.      Pupils: Pupils are equal, round, and reactive to light.   Cardiovascular:      Rate and Rhythm: Normal rate and regular rhythm.      Heart sounds: Normal heart sounds. No murmur heard.     No friction rub. No gallop.   Pulmonary:      Effort: Pulmonary effort is normal.      Breath sounds: Normal breath sounds. No wheezing or rhonchi.   Skin:     General: Skin is warm and dry.   Neurological:      Mental Status: She is alert and oriented to person, place, and time.   Psychiatric:         Mood and Affect: Mood and affect normal.         Behavior: Behavior normal.         Thought Content: Thought content normal.         Judgment: Judgment normal.             Result Review :               Assessment and Plan     Diagnoses and all orders for this visit:    1. Stress incontinence (Primary)  -     Ambulatory Referral to Physical Therapy for Evaluation & Treatment          Patient already has an appointment set up.    Follow Up     Return for Next scheduled follow up.    Patient was given instructions and counseling regarding her condition or for health maintenance advice. Please see specific information pulled into the AVS if appropriate.         "

## 2025-03-31 ENCOUNTER — CLINICAL SUPPORT (OUTPATIENT)
Dept: FAMILY MEDICINE CLINIC | Facility: CLINIC | Age: 65
End: 2025-03-31
Payer: MEDICARE

## 2025-03-31 DIAGNOSIS — Z23 NEED FOR PNEUMOCOCCAL 20-VALENT CONJUGATE VACCINATION: Primary | ICD-10-CM

## 2025-03-31 PROCEDURE — 90677 PCV20 VACCINE IM: CPT | Performed by: NURSE PRACTITIONER

## 2025-03-31 PROCEDURE — G0009 ADMIN PNEUMOCOCCAL VACCINE: HCPCS | Performed by: NURSE PRACTITIONER

## 2025-03-31 NOTE — PROGRESS NOTES
Patient Name: Celeste Bui   Visit Date: 04/02/2025   Patient ID: [id number]  Provider: LORI Sr    Sex: female  Location: [facility]   YOB: 1960  Location Address: [address] [place] [address], [address] [place] [address],?[place]?[id number]    Primary Care Provider Stephen Bentley APRN  Location Phone: [phone]    Referring Provider: No ref. provider found        Chief Complaint  6 MONTH F/U TONGUE LESION    History of Present Illness  Celeste Bui is a 65 y.o. female who presents to [facility] for 6 MONTH F/U TONGUE LESION  Patient previously seen by Dr. Valles on 9/20/2024 for a lesion of the right posterior lateral tongue.  Lesion had resolved when she stopped using her mouthguard.  Patient had also been treated with Magic mouthwash.  Patient still has a small spot on the tongue that was persisting at that time.  Patient states that her brother had a history of mouth cancer and specifically tongue cancer which they did have to resect and was treated with radiation.  Audiogram from 7/12/2024: Procedure visit with Oj Pearce Au.D (07/12/2024)   SRT 15 on the right and 10 on the left.  Word discrimination scores 100% on the right and 93% on the left.  Tympanograms were type a bilaterally.  Bilateral mild sensorineural hearing loss at 3000 Hz with a moderate sensorineural hearing loss at 4000 to 8000 Hz.  9/20/2024  Tongue lesion was primarily resolved.  Tonsils were symmetric, 0 to +1, without mass or lesion.  Patient did have some discomfort with touching the base of the tongue.  No ulcerations were present.  Also also has a history of hypothyroidism currently managed by primary care.  4/2/25  Patient presents today for 6-month follow-up.  Denies any lesions, but when she moves food around in her mouth, feels like she has a bruise at the right base of her tongue.   Denies dysphagia.   Patient has non-smoker.    Vital Signs:  Vitals:     "25 0906   BP: 129/81   Pulse: 67   Temp: 97.3 °F (36.3 °C)   TempSrc: Temporal   Weight: 65 kg (143 lb 6.4 oz)   Height: 157.5 cm (62\")        Past Medical History:   Diagnosis Date    Allergies     Colon polyp     HL (hearing loss)     Hyperlipidemia     Hypothyroidism     Impingement syndrome of shoulder, right 2017    Migraines     Patellofemoral syndrome of right knee 2017    Tendinitis of knee     Thyroid disorder        Past Surgical History:   Procedure Laterality Date    BREAST BIOPSY  2018    CARPAL TUNNEL RELEASE Bilateral 2022    Left 22     SECTION      COLONOSCOPY      COLONOSCOPY N/A 2024    Procedure: COLONOSCOPY WITH HOT & COLD SNARE POLYPECTOMY;  Surgeon: Padmini Ly MD;  Location: [facility];  Service: Gastroenterology;  Laterality: N/A;  COLON POLYPS, DIVERTICULOSIS, HEMORRHOIDS    HAND SURGERY      HYSTERECTOMY      PROLASE    OTHER SURGICAL HISTORY      URETHRAL SLING REPAIR W/ COMBINED ANTEROPOSTERIOR COLPORRHAPHY AND SACROSPINOUS FIXATION    TUBAL ABDOMINAL LIGATION           Current Outpatient Medications:     Synthroid 125 MCG tablet, Take 0.5 tablets by mouth Daily. For 6 day and a full tablet on 7th day., Disp: 62 tablet, Rfl: 1     Allergies   Allergen Reactions    Sulfa Antibiotics Anaphylaxis       Social History     Tobacco Use    Smoking status: Never     Passive exposure: Past    Smokeless tobacco: Never   Vaping Use    Vaping status: Never Used   Substance Use Topics    Alcohol use: Never    Drug use: Never        Objective     Tobacco Use: Low Risk  (2025)    Patient History     Smoking Tobacco Use: Never     Smokeless Tobacco Use: Never     Passive Exposure: Past         Physical Exam    Constitutional   Appearance  well developed, well-nourished, alert and in no acute distress, voice clear and strong    Head   Inspection  no deformities or lesions, atraumatic    Face   Inspection  no facial lesions; " House-Brackmann I/VI bilaterally   Palpation  no TMJ crepitus nor  muscle tenderness bilaterally     Eyes/Vision   Visual Fields  extraocular movements are intact, no spontaneous or gaze-induced nystagmus  Conjunctivae  clear   Sclerae  clear   Pupils and Irises  pupils equal, round, and reactive to light.   Nystagmus  not present     Ears, Nose, Mouth and Throat  Ears  External Ears  Auricles appearance within normal limits, no lesions present   Otoscopic Examination  tympanic membrane appearance within normal limits bilaterally without perforations, well-aerated middle ears without evidence of effusion  Hearing  intact to conversational voice both ears   Tunning fork testing    Rinne:  Jack:    Nose  External Nose  appearance normal   Intranasal Exam  mucosa within normal limits, vestibules normal, no intranasal lesions present, septum midline, sinuses non tender to percussion   Modified Mason Test:    Oral Cavity  Oral Mucosa  oral mucosa normal without pallor or cyanosis   Stensen's and Warthin's ducts are productive and patent with clear saliva  Lips  lip appearance normal   Teeth  normal dentition for age   Gums  gums pink, non-swollen, no bleeding present   Tongue  tongue appearance normal; normal mobility   Palate  hard palate normal, soft palate appearance normal with symmetric mobility     Throat  Oropharynx  no inflammation or lesions present  Tonsils  Bilateral tonsils unremarkable  Hypopharynx  appearance within normal limits   Larynx  voice normal     Neck  Inspection/Palpation  normal appearance, no masses or tenderness, trachea midline; thyroid size normal, nontender, no nodules or masses present on palpation     Lymphatic  Neck  no lymphadenopathy present   Supraclavicular Nodes  no lymphadenopathy present   Preauricular Nodes  no lymphadenopathy present     Respiratory  Respiratory Effort  breathing unlabored   Inspection of Chest  normal appearance, no retractions     Musculoskeletal    Cervical back: Normal range of motion and neck supple.      Skin and Subcutaneous Tissue  General Inspection  Regarding face and neck - there are no rashes present, no lesions present, and no areas of discoloration     Neurologic  Cranial Nerves  Alert and oriented x3  cranial nerves II-XII are grossly intact bilaterally   Gait and Station  normal gait, able to stand without diffculty    Psychiatric  Judgement and Insight  judgment and insight intact   Mood and Affect  mood normal, affect appropriate       RESULTS REVIEWED    I have reviewed the following information:   [x]  Previous Internal Note  [x]  Previous External Note:   [x]  Ordered Tests & Results:      Pathology:   Lab Results   Component Value Date    Microscopic Description  02/19/2024     Microscopic examination performed.         TSH   Date Value Ref Range Status   11/04/2024 0.552 0.270 - 4.200 uIU/mL Final     T3, Free   Date Value Ref Range Status   07/19/2022 2.50 2.00 - 4.40 pg/mL Final     Free T4   Date Value Ref Range Status   08/01/2024 1.38 0.92 - 1.68 ng/dL Final     Calcium   Date Value Ref Range Status   08/01/2024 9.4 8.6 - 10.5 mg/dL Final     25 Hydroxy, Vitamin D   Date Value Ref Range Status   07/29/2020 77.2 30.0 - 100.0 ng/mL Final     Comment:     Vitamin D Status          Range  ---------------------------------------  Deficiency                <10 ng/mL  Insufficiency             10-30 ng/mL  Sufficiency                ng/mL  Toxicity                  >100 ng/mL         Mammo Screening Digital Tomosynthesis Bilateral With CAD  Result Date: 2/21/2025  No mammographic evidence of malignancy.  Recommend annual screening mammography.  BI-RADS ASSESSMENT: BI-RADS 1. Negative.  Note:  It has been reported that there is approximately a 15% false negative rate in mammography.  Therefore, management of a palpable abnormality should not be deferred because of a negative mammogram.  2/21/2025 8:43 AM by Devonte Albarran MD on  Workstation: [id number]        I have discussed the interpretation of the above results with the patient.    Procedures          Assessment and Plan   Diagnoses and all orders for this visit:    1. Tongue lesion (Primary)  -     CT Soft Tissue Neck With Contrast; Future    2. Hypothyroidism (acquired)    3. Sensorineural hearing loss (SNHL) of right ear with restricted hearing of left ear    4. Sensorineural hearing loss (SNHL) of left ear with restricted hearing of right ear    5. Tinnitus, bilateral    6. Soreness of tongue  -     CT Soft Tissue Neck With Contrast; Future        (K14.8) Tongue lesion - Plan: CT Soft Tissue Neck With Contrast    (E03.9) Hypothyroidism (acquired)    (H90.A21) Sensorineural hearing loss (SNHL) of right ear with restricted hearing of left ear    (H90.A22) Sensorineural hearing loss (SNHL) of left ear with restricted hearing of right ear    (H93.13) Tinnitus, bilateral    (K14.6) Soreness of tongue - Plan: CT Soft Tissue Neck With Contrast     Celeste Bui  reports that she has never smoked. She has been exposed to tobacco smoke. She has never used smokeless tobacco.       Plan:  Patient Instructions   1.  On examination today I do not see any lumps, masses, lesions, ulcers.  But despite this, patient is still having bruised feeling at right base of tongue.  2.  Due to patient's family history of tongue cancer, I discussed option for going ahead with a CT of the neck versus continued observation and patient would like to have done.  3.  I will see patient back in 1 month to review CT results.  4.  Will follow-up with audiogram in 6 months.      Follow Up   Return in about 4 weeks (around 4/30/2025).  Patient was given instructions and counseling regarding her condition or for health maintenance advice. Please see specific information pulled into the AVS if appropriate.      All or a portion of this Note was dictated utilizing Dragon Dictation.

## 2025-04-02 ENCOUNTER — OFFICE VISIT (OUTPATIENT)
Dept: OTOLARYNGOLOGY | Facility: CLINIC | Age: 65
End: 2025-04-02
Payer: MEDICARE

## 2025-04-02 VITALS
HEIGHT: 62 IN | SYSTOLIC BLOOD PRESSURE: 129 MMHG | WEIGHT: 143.4 LBS | HEART RATE: 67 BPM | BODY MASS INDEX: 26.39 KG/M2 | TEMPERATURE: 97.3 F | DIASTOLIC BLOOD PRESSURE: 81 MMHG

## 2025-04-02 DIAGNOSIS — H90.A21 SENSORINEURAL HEARING LOSS (SNHL) OF RIGHT EAR WITH RESTRICTED HEARING OF LEFT EAR: ICD-10-CM

## 2025-04-02 DIAGNOSIS — E03.9 HYPOTHYROIDISM (ACQUIRED): ICD-10-CM

## 2025-04-02 DIAGNOSIS — H90.A22 SENSORINEURAL HEARING LOSS (SNHL) OF LEFT EAR WITH RESTRICTED HEARING OF RIGHT EAR: ICD-10-CM

## 2025-04-02 DIAGNOSIS — H93.13 TINNITUS, BILATERAL: ICD-10-CM

## 2025-04-02 DIAGNOSIS — K14.8 TONGUE LESION: Primary | ICD-10-CM

## 2025-04-02 DIAGNOSIS — K14.6 SORENESS OF TONGUE: ICD-10-CM

## 2025-04-02 NOTE — PATIENT INSTRUCTIONS
1.  On examination today I do not see any lumps, masses, lesions, ulcers.  But despite this, patient is still having bruised feeling at right base of tongue.  2.  Due to patient's family history of tongue cancer, I discussed option for going ahead with a CT of the neck versus continued observation and patient would like to have done.  3.  I will see patient back in 1 month to review CT results.  4.  Will follow-up with audiogram in 6 months.

## 2025-04-15 ENCOUNTER — HOSPITAL ENCOUNTER (OUTPATIENT)
Dept: CT IMAGING | Facility: HOSPITAL | Age: 65
Discharge: HOME | End: 2025-04-15
Payer: MEDICARE

## 2025-04-15 DIAGNOSIS — K14.8 TONGUE LESION: ICD-10-CM

## 2025-04-15 DIAGNOSIS — K14.6 SORENESS OF TONGUE: ICD-10-CM

## 2025-04-15 LAB
CREAT BLDA-MCNC: 0.9 MG/DL (ref 0.6–1.3)
EGFRCR SERPLBLD CKD-EPI 2021: 71.1 ML/MIN/1.73

## 2025-04-15 PROCEDURE — 70491 CT SOFT TISSUE NECK W/DYE: CPT

## 2025-04-15 PROCEDURE — 82565 ASSAY OF CREATININE: CPT

## 2025-04-15 PROCEDURE — 25510000001 IOPAMIDOL PER 1 ML

## 2025-04-15 RX ORDER — IOPAMIDOL 755 MG/ML
100 INJECTION, SOLUTION INTRAVASCULAR
Status: COMPLETED | OUTPATIENT
Start: 2025-04-15 | End: 2025-04-15

## 2025-04-15 RX ADMIN — IOPAMIDOL 75 ML: 755 INJECTION, SOLUTION INTRAVENOUS at 08:09

## 2025-05-01 NOTE — PROGRESS NOTES
Patient Name: Celeste Bui   Visit Date: 05/05/2025   Patient ID: [id number]  Provider: LORI Sr    Sex: female  Location: [facility]   YOB: 1960  Location Address: [address] [place] [address], [address] [place] [address],?[id number]    Primary Care Provider Stephen Bentley APRN  Location Phone: [phone]    Referring Provider: No ref. provider found        Chief Complaint  F/UP WITH CT RESULTS    History of Present Illness  Celeste Bui is a 65 y.o. female who presents to [facility] for F/UP WITH CT RESULTS  Patient previously seen by Dr. Bellamy on 9/20/2024 for a lesion of the right posterior lateral tongue.  Lesion had resolved when she stopped using her mouthguard.  Patient had also been treated with Magic mouthwash.  Patient still has a small spot on the tongue that was persisting at that time.  Patient states that her brother had a history of mouth cancer and specifically tongue cancer which they did have to resect and was treated with radiation.  Audiogram from 7/12/2024: Procedure visit with Oj Pearce Au.D (07/12/2024)   SRT 15 on the right and 10 on the left.  Word discrimination scores 100% on the right and 93% on the left.  Tympanograms were type A bilaterally.  Bilateral mild sensorineural hearing loss at 3000 Hz with a moderate sensorineural hearing loss at 4000 to 8000 Hz.  9/20/2024  Tongue lesion was primarily resolved.  Tonsils were symmetric, 0 to +1, without mass or lesion.  Patient did have some discomfort with touching the base of the tongue.  No ulcerations were present.  Also also has a history of hypothyroidism currently managed by primary care.  4/2/25  Patient presents today for 6-month follow-up.  Denies any lesions, but when she moves food around in her mouth, feels like she has a bruise at the right base of her tongue.   Denies dysphagia.   Patient has non-smoker.  CT soft tissue neck from 4/15/2025:  "There is a 1.5 cm polyp in the floor of the left maxillary sinus. The frontal sinuses are hypoplastic. The remaining paranasal sinuses are clear. No mucosal space masses are identified. No evidence of soft tissue mass or adenopathy in the neck. Degenerative changes are present in the cervical spine.   25  CT reviewed.  Offered to check B vitamins.  Patient still having ongoing soreness without lesion or mass at base of tongue.    Vital Signs:  Vitals:    25 0802   BP: 120/77   Pulse: 75   Temp: 97.3 °F (36.3 °C)   TempSrc: Temporal   Weight: 64 kg (141 lb 3.2 oz)   Height: 157.5 cm (62\")        Past Medical History:   Diagnosis Date    Allergies     Colon polyp     HL (hearing loss)     Hyperlipidemia     Hypothyroidism     Impingement syndrome of shoulder, right 2017    Migraines     Patellofemoral syndrome of right knee 2017    Tendinitis of knee     Thyroid disorder        Past Surgical History:   Procedure Laterality Date    BREAST BIOPSY  2018    CARPAL TUNNEL RELEASE Bilateral 2022    Left 22     SECTION      COLONOSCOPY      COLONOSCOPY N/A 2024    Procedure: COLONOSCOPY WITH HOT & COLD SNARE POLYPECTOMY;  Surgeon: Padmini Ly MD;  Location: [facility] END[facility];  Service: Gastroenterology;  Laterality: N/A;  COLON POLYPS, DIVERTICULOSIS, HEMORRHOIDS    HAND SURGERY      HYSTERECTOMY  2010    PROLASE    OTHER SURGICAL HISTORY  2016    URETHRAL SLING REPAIR W/ COMBINED ANTEROPOSTERIOR COLPORRHAPHY AND SACROSPINOUS FIXATION    TUBAL ABDOMINAL LIGATION           Current Outpatient Medications:     Synthroid 125 MCG tablet, Take 0.5 tablets by mouth Daily. For 6 day and a full tablet on 7th day., Disp: 62 tablet, Rfl: 1     Allergies   Allergen Reactions    Sulfa Antibiotics Anaphylaxis       Social History     Tobacco Use    Smoking status: Never     Passive exposure: Past    Smokeless tobacco: Never   Vaping Use    Vaping status: Never Used   Substance Use " Topics    Alcohol use: Never    Drug use: Never        Objective     Tobacco Use: Low Risk  (5/5/2025)    Patient History     Smoking Tobacco Use: Never     Smokeless Tobacco Use: Never     Passive Exposure: Past         Physical Exam    Constitutional   Appearance  well developed, well-nourished, alert and in no acute distress, voice clear and strong    Head   Inspection  no deformities or lesions, atraumatic    Face   Inspection  no facial lesions; House-Brackmann I/VI bilaterally   Palpation  no TMJ crepitus nor  muscle tenderness bilaterally     Eyes/Vision   Visual Fields  extraocular movements are intact, no spontaneous or gaze-induced nystagmus  Conjunctivae  clear   Sclerae  clear   Pupils and Irises  pupils equal, round, and reactive to light.   Nystagmus  not present     Ears, Nose, Mouth and Throat  Ears  External Ears  Auricles appearance within normal limits, no lesions present   Otoscopic Examination  Bilateral mild nonocclusive cerumen  tympanic membrane appearance within normal limits bilaterally without perforations, well-aerated middle ears without evidence of effusion  Hearing  intact to conversational voice both ears   Tunning fork testing    Rinne:  Jack:    Nose  External Nose  appearance normal   Intranasal Exam  mucosa within normal limits, vestibules normal, no intranasal lesions present, septum midline, sinuses non tender to percussion   Modified Mason Test:    Oral Cavity  Oral Mucosa  oral mucosa normal without pallor or cyanosis   Stensen's and Warthin's ducts are productive and patent with clear saliva  Lips  lip appearance normal   Teeth  normal dentition for age   Gums  gums pink, non-swollen, no bleeding present   Tongue  tongue appearance normal; normal mobility   Palate  hard palate normal, soft palate appearance normal with symmetric mobility     Throat  Oropharynx  no inflammation or lesions present  Tonsils  Bilateral tonsils unremarkable  Hypopharynx  appearance  within normal limits   Larynx  voice normal     Neck  Inspection/Palpation  normal appearance, no masses or tenderness, trachea midline; thyroid size normal, nontender, no nodules or masses present on palpation     Lymphatic  Neck  no lymphadenopathy present   Supraclavicular Nodes  no lymphadenopathy present   Preauricular Nodes  no lymphadenopathy present     Respiratory  Respiratory Effort  breathing unlabored   Inspection of Chest  normal appearance, no retractions     Musculoskeletal   Cervical back: Normal range of motion and neck supple.      Skin and Subcutaneous Tissue  General Inspection  Regarding face and neck - there are no rashes present, no lesions present, and no areas of discoloration     Neurologic  Cranial Nerves  Alert and oriented x3  cranial nerves II-XII are grossly intact bilaterally   Gait and Station  normal gait, able to stand without diffculty    Psychiatric  Judgement and Insight  judgment and insight intact   Mood and Affect  mood normal, affect appropriate       RESULTS REVIEWED    I have reviewed the following information:   [x]  Previous Internal Note  []  Previous External Note:   [x]  Ordered Tests & Results:      Pathology:   Lab Results   Component Value Date    Microscopic Description  02/19/2024     Microscopic examination performed.         TSH   Date Value Ref Range Status   11/04/2024 0.552 0.270 - 4.200 uIU/mL Final     T3, Free   Date Value Ref Range Status   07/19/2022 2.50 2.00 - 4.40 pg/mL Final     Free T4   Date Value Ref Range Status   08/01/2024 1.38 0.92 - 1.68 ng/dL Final     Calcium   Date Value Ref Range Status   08/01/2024 9.4 8.6 - 10.5 mg/dL Final     25 Hydroxy, Vitamin D   Date Value Ref Range Status   07/29/2020 77.2 30.0 - 100.0 ng/mL Final     Comment:     Vitamin D Status          Range  ---------------------------------------  Deficiency                <10 ng/mL  Insufficiency             10-30 ng/mL  Sufficiency                ng/mL  Toxicity                   >100 ng/mL         CT Soft Tissue Neck With Contrast  Result Date: 4/16/2025  Impression: No acute findings. No evidence of soft tissue mass or adenopathy. Electronically Signed: Javier Guillen MD  4/16/2025 12:00 PM EDT  Workstation ID: [id number]    Mammo Screening Digital Tomosynthesis Bilateral With CAD  Result Date: 2/21/2025  No mammographic evidence of malignancy.  Recommend annual screening mammography.  BI-RADS ASSESSMENT: BI-RADS 1. Negative.  Note:  It has been reported that there is approximately a 15% false negative rate in mammography.  Therefore, management of a palpable abnormality should not be deferred because of a negative mammogram.  2/21/2025 8:43 AM by Devonte Albarran MD on Workstation: [id number]        I have discussed the interpretation of the above results with the patient.    Procedures          Assessment and Plan   Diagnoses and all orders for this visit:    1. Soreness of tongue (Primary)        (K14.6) Soreness of tongue     Celeste Bui  reports that she has never smoked. She has been exposed to tobacco smoke. She has never used smokeless tobacco.       Plan:  Patient Instructions   1.  Patient still having right-sided tongue feeling of bruising.  CT negative I did personally review the images and did not see any abnormalities.  Offered to check patient's B vitamins, and she states that she will just take a B complex vitamin and see if it helps at all.  2.  We discussed the mucous retention cyst of her left maxillary sinus.  Patient does not have any current symptoms.  Discussed use of Flonase.  3.  At this point I will see patient back on an as-needed basis.      Follow Up   Return if symptoms worsen or fail to improve.  Patient was given instructions and counseling regarding her condition or for health maintenance advice. Please see specific information pulled into the AVS if appropriate.      All or a portion of this Note was dictated utilizing Dragon Dictation.

## 2025-05-05 ENCOUNTER — OFFICE VISIT (OUTPATIENT)
Dept: OTOLARYNGOLOGY | Facility: CLINIC | Age: 65
End: 2025-05-05
Payer: MEDICARE

## 2025-05-05 VITALS
BODY MASS INDEX: 25.98 KG/M2 | DIASTOLIC BLOOD PRESSURE: 77 MMHG | HEART RATE: 75 BPM | HEIGHT: 62 IN | WEIGHT: 141.2 LBS | TEMPERATURE: 97.3 F | SYSTOLIC BLOOD PRESSURE: 120 MMHG

## 2025-05-05 DIAGNOSIS — K14.6 SORENESS OF TONGUE: Primary | ICD-10-CM

## 2025-05-05 PROCEDURE — 99213 OFFICE O/P EST LOW 20 MIN: CPT

## 2025-05-05 NOTE — PATIENT INSTRUCTIONS
1.  Patient still having right-sided tongue feeling of bruising.  CT negative I did personally review the images and did not see any abnormalities.  Offered to check patient's B vitamins, and she states that she will just take a B complex vitamin and see if it helps at all.  2.  We discussed the mucous retention cyst of her left maxillary sinus.  Patient does not have any current symptoms.  Discussed use of Flonase.  3.  At this point I will see patient back on an as-needed basis.

## 2025-05-15 DIAGNOSIS — E07.9 THYROID DISORDER: ICD-10-CM

## 2025-05-15 RX ORDER — LEVOTHYROXINE SODIUM 125 MCG
TABLET ORAL
Qty: 62 TABLET | Refills: 1 | Status: SHIPPED | OUTPATIENT
Start: 2025-05-15

## 2025-07-28 ENCOUNTER — LAB (OUTPATIENT)
Dept: LAB | Facility: HOSPITAL | Age: 65
End: 2025-07-28
Payer: MEDICARE

## 2025-07-28 DIAGNOSIS — Z00.00 ANNUAL PHYSICAL EXAM: Primary | ICD-10-CM

## 2025-07-28 DIAGNOSIS — Z00.00 ANNUAL PHYSICAL EXAM: ICD-10-CM

## 2025-07-28 DIAGNOSIS — Z83.3 FAMILY HISTORY OF DIABETES MELLITUS: ICD-10-CM

## 2025-07-28 LAB
ALBUMIN SERPL-MCNC: 3.8 G/DL (ref 3.5–5.2)
ALBUMIN/GLOB SERPL: 1.2 G/DL
ALP SERPL-CCNC: 80 U/L (ref 39–117)
ALT SERPL W P-5'-P-CCNC: 10 U/L (ref 1–33)
ANION GAP SERPL CALCULATED.3IONS-SCNC: 9.3 MMOL/L (ref 5–15)
AST SERPL-CCNC: 18 U/L (ref 1–32)
BASOPHILS # BLD AUTO: 0.02 10*3/MM3 (ref 0–0.2)
BASOPHILS NFR BLD AUTO: 0.4 % (ref 0–1.5)
BILIRUB SERPL-MCNC: 0.5 MG/DL (ref 0–1.2)
BILIRUB UR QL STRIP: NEGATIVE
BUN SERPL-MCNC: 9 MG/DL (ref 8–23)
BUN/CREAT SERPL: 11 (ref 7–25)
CALCIUM SPEC-SCNC: 9.4 MG/DL (ref 8.6–10.5)
CHLORIDE SERPL-SCNC: 105 MMOL/L (ref 98–107)
CHOLEST SERPL-MCNC: 262 MG/DL (ref 0–200)
CLARITY UR: CLEAR
CO2 SERPL-SCNC: 25.7 MMOL/L (ref 22–29)
COLOR UR: YELLOW
CREAT SERPL-MCNC: 0.82 MG/DL (ref 0.57–1)
DEPRECATED RDW RBC AUTO: 41.7 FL (ref 37–54)
EGFRCR SERPLBLD CKD-EPI 2021: 79.5 ML/MIN/1.73
EOSINOPHIL # BLD AUTO: 0.12 10*3/MM3 (ref 0–0.4)
EOSINOPHIL NFR BLD AUTO: 2.3 % (ref 0.3–6.2)
ERYTHROCYTE [DISTWIDTH] IN BLOOD BY AUTOMATED COUNT: 13.4 % (ref 12.3–15.4)
GLOBULIN UR ELPH-MCNC: 3.2 GM/DL
GLUCOSE SERPL-MCNC: 98 MG/DL (ref 65–99)
GLUCOSE UR STRIP-MCNC: NEGATIVE MG/DL
HBA1C MFR BLD: 5.6 % (ref 4.8–5.6)
HCT VFR BLD AUTO: 42.9 % (ref 34–46.6)
HDLC SERPL QL: 5.57
HDLC SERPL-MCNC: 47 MG/DL (ref 40–60)
HGB BLD-MCNC: 13.9 G/DL (ref 12–15.9)
HGB UR QL STRIP.AUTO: NEGATIVE
HOLD SPECIMEN: NORMAL
IMM GRANULOCYTES # BLD AUTO: 0.01 10*3/MM3 (ref 0–0.05)
IMM GRANULOCYTES NFR BLD AUTO: 0.2 % (ref 0–0.5)
KETONES UR QL STRIP: NEGATIVE
LDLC SERPL CALC-MCNC: 196 MG/DL (ref 0–100)
LEUKOCYTE ESTERASE UR QL STRIP.AUTO: NEGATIVE
LYMPHOCYTES # BLD AUTO: 1.47 10*3/MM3 (ref 0.7–3.1)
LYMPHOCYTES NFR BLD AUTO: 27.9 % (ref 19.6–45.3)
MCH RBC QN AUTO: 28 PG (ref 26.6–33)
MCHC RBC AUTO-ENTMCNC: 32.4 G/DL (ref 31.5–35.7)
MCV RBC AUTO: 86.5 FL (ref 79–97)
MONOCYTES # BLD AUTO: 0.29 10*3/MM3 (ref 0.1–0.9)
MONOCYTES NFR BLD AUTO: 5.5 % (ref 5–12)
NEUTROPHILS NFR BLD AUTO: 3.35 10*3/MM3 (ref 1.7–7)
NEUTROPHILS NFR BLD AUTO: 63.7 % (ref 42.7–76)
NITRITE UR QL STRIP: NEGATIVE
NRBC BLD AUTO-RTO: 0 /100 WBC (ref 0–0.2)
PH UR STRIP.AUTO: 6 [PH] (ref 5–8)
PLATELET # BLD AUTO: 274 10*3/MM3 (ref 140–450)
PMV BLD AUTO: 9 FL (ref 6–12)
POTASSIUM SERPL-SCNC: 4.4 MMOL/L (ref 3.5–5.2)
PROT SERPL-MCNC: 7 G/DL (ref 6–8.5)
PROT UR QL STRIP: NEGATIVE
RBC # BLD AUTO: 4.96 10*6/MM3 (ref 3.77–5.28)
SODIUM SERPL-SCNC: 140 MMOL/L (ref 136–145)
SP GR UR STRIP: 1.01 (ref 1–1.03)
TRIGL SERPL-MCNC: 108 MG/DL (ref 0–150)
TSH SERPL DL<=0.05 MIU/L-ACNC: 0.27 UIU/ML (ref 0.27–4.2)
UROBILINOGEN UR QL STRIP: NORMAL
VLDLC SERPL-MCNC: 19 MG/DL (ref 5–40)
WBC NRBC COR # BLD AUTO: 5.26 10*3/MM3 (ref 3.4–10.8)

## 2025-07-28 PROCEDURE — 80061 LIPID PANEL: CPT

## 2025-07-28 PROCEDURE — 84443 ASSAY THYROID STIM HORMONE: CPT

## 2025-07-28 PROCEDURE — 36415 COLL VENOUS BLD VENIPUNCTURE: CPT

## 2025-07-28 PROCEDURE — 80053 COMPREHEN METABOLIC PANEL: CPT

## 2025-07-28 PROCEDURE — 83036 HEMOGLOBIN GLYCOSYLATED A1C: CPT

## 2025-07-28 PROCEDURE — 85025 COMPLETE CBC W/AUTO DIFF WBC: CPT

## 2025-07-28 PROCEDURE — 81003 URINALYSIS AUTO W/O SCOPE: CPT

## 2025-08-06 ENCOUNTER — OFFICE VISIT (OUTPATIENT)
Dept: FAMILY MEDICINE CLINIC | Facility: CLINIC | Age: 65
End: 2025-08-06
Payer: MEDICARE

## 2025-08-06 VITALS
WEIGHT: 141.4 LBS | HEART RATE: 68 BPM | TEMPERATURE: 97.9 F | SYSTOLIC BLOOD PRESSURE: 100 MMHG | OXYGEN SATURATION: 96 % | RESPIRATION RATE: 16 BRPM | HEIGHT: 62 IN | BODY MASS INDEX: 26.02 KG/M2 | DIASTOLIC BLOOD PRESSURE: 74 MMHG

## 2025-08-06 DIAGNOSIS — E78.2 MIXED HYPERLIPIDEMIA: Primary | ICD-10-CM

## 2025-08-06 DIAGNOSIS — E07.9 THYROID DISORDER: ICD-10-CM

## 2025-08-06 PROCEDURE — 1159F MED LIST DOCD IN RCRD: CPT | Performed by: NURSE PRACTITIONER

## 2025-08-06 PROCEDURE — 99213 OFFICE O/P EST LOW 20 MIN: CPT | Performed by: NURSE PRACTITIONER

## 2025-08-06 PROCEDURE — 1126F AMNT PAIN NOTED NONE PRSNT: CPT | Performed by: NURSE PRACTITIONER

## 2025-08-06 PROCEDURE — G0439 PPPS, SUBSEQ VISIT: HCPCS | Performed by: NURSE PRACTITIONER

## 2025-08-06 PROCEDURE — 1170F FXNL STATUS ASSESSED: CPT | Performed by: NURSE PRACTITIONER

## 2025-08-06 PROCEDURE — 1160F RVW MEDS BY RX/DR IN RCRD: CPT | Performed by: NURSE PRACTITIONER

## 2025-08-06 RX ORDER — LEVOTHYROXINE SODIUM 125 MCG
125 TABLET ORAL
Qty: 62 TABLET | Refills: 3 | Status: SHIPPED | OUTPATIENT
Start: 2025-08-06

## 2025-08-13 ENCOUNTER — HOSPITAL ENCOUNTER (OUTPATIENT)
Dept: CT IMAGING | Facility: HOSPITAL | Age: 65
Discharge: HOME | End: 2025-08-13
Admitting: NURSE PRACTITIONER

## 2025-08-13 DIAGNOSIS — E78.2 MIXED HYPERLIPIDEMIA: ICD-10-CM

## 2025-08-13 PROCEDURE — 75571 CT HRT W/O DYE W/CA TEST: CPT

## (undated) DEVICE — KT VLV BIOP DEFENDO SXN AIR/WATER

## (undated) DEVICE — PAD GRND REM POLYHESIVE A/ DISP

## (undated) DEVICE — LINER SURG CANSTR SXN S/RIGD 1500CC

## (undated) DEVICE — SOL IRRG H2O PL/BG 1000ML STRL

## (undated) DEVICE — CONN JET HYDRA H20 AUXILIARY DISP

## (undated) DEVICE — SOLIDIFIER LIQLOC PLS 1500CC BT

## (undated) DEVICE — Device

## (undated) DEVICE — SNAR E/S POLYP SNAREMASTER OVL/10MM 2.8X2300MM YEL

## (undated) DEVICE — TRAP POLYP ETRAP 2PK